# Patient Record
Sex: MALE | Race: WHITE | Employment: FULL TIME | URBAN - METROPOLITAN AREA
[De-identification: names, ages, dates, MRNs, and addresses within clinical notes are randomized per-mention and may not be internally consistent; named-entity substitution may affect disease eponyms.]

---

## 2017-01-21 ENCOUNTER — DOCUMENTATION ONLY (OUTPATIENT)
Dept: CARDIOLOGY | Facility: CLINIC | Age: 37
End: 2017-01-21

## 2017-01-21 NOTE — Clinical Note
January 21, 2017       TO: Milton Rivera  [address] [facility] [address]       Dear Milton Rivera,    We are reviewing our outstanding orders.    Dr. Nicholas has ordered an office visit with his assistant with lab work and a stress echo for follow up.      Please contact the scheduling desk at [phone] to arranged for an appointment.     If you have any questions, please call the [facility] nurse phone @ [phone]. If you had your lab work done at another facility, please give us a call so we can locate the results.     Thank you  [facility] nurses

## 2017-04-10 ENCOUNTER — RECORDS - HEALTHEAST (OUTPATIENT)
Dept: LAB | Facility: CLINIC | Age: 37
End: 2017-04-10

## 2017-04-10 LAB
CHOLEST SERPL-MCNC: 184 MG/DL
FASTING STATUS PATIENT QL REPORTED: NORMAL
HDLC SERPL-MCNC: 47 MG/DL
LDLC SERPL CALC-MCNC: 108 MG/DL
TRIGL SERPL-MCNC: 147 MG/DL

## 2018-03-28 ENCOUNTER — AMBULATORY - HEALTHEAST (OUTPATIENT)
Dept: CARDIOLOGY | Facility: CLINIC | Age: 38
End: 2018-03-28

## 2018-03-28 ENCOUNTER — RECORDS - HEALTHEAST (OUTPATIENT)
Dept: ADMINISTRATIVE | Facility: OTHER | Age: 38
End: 2018-03-28

## 2018-03-28 ENCOUNTER — OFFICE VISIT - HEALTHEAST (OUTPATIENT)
Dept: CARDIOLOGY | Facility: CLINIC | Age: 38
End: 2018-03-28

## 2018-03-28 ENCOUNTER — COMMUNICATION - HEALTHEAST (OUTPATIENT)
Dept: TELEHEALTH | Facility: CLINIC | Age: 38
End: 2018-03-28

## 2018-03-28 DIAGNOSIS — I10 ESSENTIAL HYPERTENSION: ICD-10-CM

## 2018-03-28 DIAGNOSIS — R07.9 CHEST PAIN, UNSPECIFIED TYPE: ICD-10-CM

## 2018-03-28 DIAGNOSIS — G47.33 OSA ON CPAP: ICD-10-CM

## 2018-03-28 ASSESSMENT — MIFFLIN-ST. JEOR: SCORE: 2350.96

## 2018-08-03 ENCOUNTER — RECORDS - HEALTHEAST (OUTPATIENT)
Dept: LAB | Facility: CLINIC | Age: 38
End: 2018-08-03

## 2018-08-03 ENCOUNTER — RECORDS - HEALTHEAST (OUTPATIENT)
Dept: ADMINISTRATIVE | Facility: OTHER | Age: 38
End: 2018-08-03

## 2018-08-03 LAB
ALBUMIN SERPL-MCNC: 4.2 G/DL (ref 3.5–5)
ALP SERPL-CCNC: 46 U/L (ref 45–120)
ALT SERPL W P-5'-P-CCNC: 87 U/L (ref 0–45)
ANION GAP SERPL CALCULATED.3IONS-SCNC: 15 MMOL/L (ref 5–18)
AST SERPL W P-5'-P-CCNC: 48 U/L (ref 0–40)
BILIRUB SERPL-MCNC: 1.4 MG/DL (ref 0–1)
BUN SERPL-MCNC: 14 MG/DL (ref 8–22)
CALCIUM SERPL-MCNC: 9.6 MG/DL (ref 8.5–10.5)
CHLORIDE BLD-SCNC: 107 MMOL/L (ref 98–107)
CHOLEST SERPL-MCNC: 232 MG/DL
CO2 SERPL-SCNC: 21 MMOL/L (ref 22–31)
CREAT SERPL-MCNC: 0.93 MG/DL (ref 0.7–1.3)
FASTING STATUS PATIENT QL REPORTED: YES
GFR SERPL CREATININE-BSD FRML MDRD: >60 ML/MIN/1.73M2
GLUCOSE BLD-MCNC: 83 MG/DL (ref 70–125)
HDLC SERPL-MCNC: 44 MG/DL
LDLC SERPL CALC-MCNC: 166 MG/DL
POTASSIUM BLD-SCNC: 3.8 MMOL/L (ref 3.5–5)
PROT SERPL-MCNC: 7.5 G/DL (ref 6–8)
SODIUM SERPL-SCNC: 143 MMOL/L (ref 136–145)
TRIGL SERPL-MCNC: 109 MG/DL
TSH SERPL DL<=0.005 MIU/L-ACNC: 0.84 UIU/ML (ref 0.3–5)

## 2018-08-07 ENCOUNTER — AMBULATORY - HEALTHEAST (OUTPATIENT)
Dept: SURGERY | Facility: CLINIC | Age: 38
End: 2018-08-07

## 2018-08-07 DIAGNOSIS — R63.1 POLYDIPSIA: ICD-10-CM

## 2018-09-28 ENCOUNTER — RECORDS - HEALTHEAST (OUTPATIENT)
Dept: LAB | Facility: CLINIC | Age: 38
End: 2018-09-28

## 2018-09-28 LAB
ALBUMIN SERPL-MCNC: 4.1 G/DL (ref 3.5–5)
ALP SERPL-CCNC: 58 U/L (ref 45–120)
ALT SERPL W P-5'-P-CCNC: 35 U/L (ref 0–45)
AST SERPL W P-5'-P-CCNC: 23 U/L (ref 0–40)
BILIRUB DIRECT SERPL-MCNC: 0.3 MG/DL
BILIRUB SERPL-MCNC: 1 MG/DL (ref 0–1)
PROT SERPL-MCNC: 7.1 G/DL (ref 6–8)

## 2018-11-30 ENCOUNTER — RECORDS - HEALTHEAST (OUTPATIENT)
Dept: LAB | Facility: CLINIC | Age: 38
End: 2018-11-30

## 2018-11-30 LAB
ANION GAP SERPL CALCULATED.3IONS-SCNC: 10 MMOL/L (ref 5–18)
BUN SERPL-MCNC: 15 MG/DL (ref 8–22)
CALCIUM SERPL-MCNC: 9.6 MG/DL (ref 8.5–10.5)
CHLORIDE BLD-SCNC: 106 MMOL/L (ref 98–107)
CO2 SERPL-SCNC: 26 MMOL/L (ref 22–31)
CREAT SERPL-MCNC: 0.86 MG/DL (ref 0.7–1.3)
GFR SERPL CREATININE-BSD FRML MDRD: >60 ML/MIN/1.73M2
GLUCOSE BLD-MCNC: 84 MG/DL (ref 70–125)
POTASSIUM BLD-SCNC: 3.9 MMOL/L (ref 3.5–5)
SODIUM SERPL-SCNC: 142 MMOL/L (ref 136–145)

## 2019-10-01 ENCOUNTER — HEALTH MAINTENANCE LETTER (OUTPATIENT)
Age: 39
End: 2019-10-01

## 2020-01-09 ENCOUNTER — RECORDS - HEALTHEAST (OUTPATIENT)
Dept: LAB | Facility: CLINIC | Age: 40
End: 2020-01-09

## 2020-01-09 LAB
ALBUMIN SERPL-MCNC: 4 G/DL (ref 3.5–5)
ALP SERPL-CCNC: 54 U/L (ref 45–120)
ALT SERPL W P-5'-P-CCNC: 51 U/L (ref 0–45)
ANION GAP SERPL CALCULATED.3IONS-SCNC: 11 MMOL/L (ref 5–18)
AST SERPL W P-5'-P-CCNC: 30 U/L (ref 0–40)
BILIRUB SERPL-MCNC: 0.6 MG/DL (ref 0–1)
BUN SERPL-MCNC: 10 MG/DL (ref 8–22)
CALCIUM SERPL-MCNC: 9.6 MG/DL (ref 8.5–10.5)
CHLORIDE BLD-SCNC: 106 MMOL/L (ref 98–107)
CHOLEST SERPL-MCNC: 245 MG/DL
CO2 SERPL-SCNC: 23 MMOL/L (ref 22–31)
CREAT SERPL-MCNC: 0.85 MG/DL (ref 0.7–1.3)
FASTING STATUS PATIENT QL REPORTED: ABNORMAL
GFR SERPL CREATININE-BSD FRML MDRD: >60 ML/MIN/1.73M2
GLUCOSE BLD-MCNC: 88 MG/DL (ref 70–125)
HDLC SERPL-MCNC: 48 MG/DL
LDLC SERPL CALC-MCNC: 148 MG/DL
POTASSIUM BLD-SCNC: 3.8 MMOL/L (ref 3.5–5)
PROT SERPL-MCNC: 7.4 G/DL (ref 6–8)
SODIUM SERPL-SCNC: 140 MMOL/L (ref 136–145)
TRIGL SERPL-MCNC: 245 MG/DL

## 2020-04-14 ENCOUNTER — APPOINTMENT (OUTPATIENT)
Age: 40
Setting detail: DERMATOLOGY
End: 2020-04-14

## 2020-04-14 VITALS — HEIGHT: 70 IN | WEIGHT: 315 LBS

## 2020-04-14 DIAGNOSIS — L73.8 OTHER SPECIFIED FOLLICULAR DISORDERS: ICD-10-CM

## 2020-04-14 DIAGNOSIS — L82.1 OTHER SEBORRHEIC KERATOSIS: ICD-10-CM

## 2020-04-14 DIAGNOSIS — L20.89 OTHER ATOPIC DERMATITIS: ICD-10-CM

## 2020-04-14 DIAGNOSIS — D22 MELANOCYTIC NEVI: ICD-10-CM

## 2020-04-14 PROBLEM — D22.5 MELANOCYTIC NEVI OF TRUNK: Status: ACTIVE | Noted: 2020-04-14

## 2020-04-14 PROBLEM — L20.84 INTRINSIC (ALLERGIC) ECZEMA: Status: ACTIVE | Noted: 2020-04-14

## 2020-04-14 PROCEDURE — OTHER ADDITIONAL NOTES: OTHER

## 2020-04-14 PROCEDURE — OTHER PRESCRIPTION: OTHER

## 2020-04-14 PROCEDURE — 99213 OFFICE O/P EST LOW 20 MIN: CPT

## 2020-04-14 PROCEDURE — OTHER PHOTO-DOCUMENTATION: OTHER

## 2020-04-14 PROCEDURE — OTHER COUNSELING: OTHER

## 2020-04-14 RX ORDER — TRIAMCINOLONE ACETONIDE 1 MG/G
0.1% CREAM TOPICAL BID PRN
Qty: 1 | Refills: 1 | Status: ERX | COMMUNITY
Start: 2020-04-14

## 2020-04-14 ASSESSMENT — LOCATION ZONE DERM
LOCATION ZONE: HAND
LOCATION ZONE: FACE
LOCATION ZONE: TRUNK
LOCATION ZONE: SCALP

## 2020-04-14 ASSESSMENT — LOCATION DETAILED DESCRIPTION DERM
LOCATION DETAILED: MEDIAL FRONTAL SCALP
LOCATION DETAILED: LEFT ULNAR DORSAL HAND
LOCATION DETAILED: RIGHT RADIAL DORSAL HAND
LOCATION DETAILED: SUPERIOR LUMBAR SPINE
LOCATION DETAILED: INFERIOR MID FOREHEAD

## 2020-04-14 ASSESSMENT — LOCATION SIMPLE DESCRIPTION DERM
LOCATION SIMPLE: LEFT HAND
LOCATION SIMPLE: FRONTAL SCALP
LOCATION SIMPLE: LOWER BACK
LOCATION SIMPLE: INFERIOR FOREHEAD
LOCATION SIMPLE: RIGHT HAND

## 2020-04-14 NOTE — PROCEDURE: PHOTO-DOCUMENTATION
Detail Level: Zone
Details (Free Text): photo + measurement
Photo Preface (Leave Blank If You Do Not Want): Photographs were obtained today

## 2020-04-14 NOTE — PROCEDURE: ADDITIONAL NOTES
Additional Notes: Discussed that this lesion can be monitored closely or biopsied due to patients wife’s concern. Patient prefers that we monitor this lesion at this time.
Detail Level: Simple
Additional Notes: Patient may continue using oatmeal moisturizer.

## 2020-04-14 NOTE — HPI: RASH (ECZEMA)
How Severe Is Your Eczema?: moderate
Is This A New Presentation, Or A Follow-Up?: Rash
Additional History: His daughter has eczema but he has never had it

## 2021-01-15 ENCOUNTER — HEALTH MAINTENANCE LETTER (OUTPATIENT)
Age: 41
End: 2021-01-15

## 2021-06-01 VITALS — BODY MASS INDEX: 44.1 KG/M2 | HEIGHT: 71 IN | WEIGHT: 315 LBS

## 2021-06-16 NOTE — PROGRESS NOTES
[facility] Consultation Note    Thank you, Dr. Timo Pack MD, for asking the [facility] team to see Milton Rivera in consultation today at our clinic to evaluate chest pain.      Assessment:   1.  Atypical chest pain doubt anginal  2.  Essential hypertension  3.  Obstructive sleep apnea on nocturnal CPAP     Plan:   We will exclude significant underlying coronary artery disease with a Lexiscan Cardiolite test in the near future.  Will do a Lexiscan test due to beta-blocker therapy.  If his Cardiolite results are unremarkable no further cardiac evaluation or follow-up is needed            Current History:   37-year-old male with a history of intermittent chest pain.  He was evaluated with coronary angiography at [facility] roughly 9 years ago with normal coronary arteries reported.  The actual results were not available for review.  He had a Lexiscan Cardiolite test performed in October 2012 that showed no evidence of ischemia with ejection fraction 56%.  Over the last 2 weeks he has left upper chest discomfort above his left breast that occurs nonexertional he would come and go but generally lasts less than an hour.  Last night he had a more intense episode that was somewhat squeezing in nature radiating to his left arm again relieves spontaneously and not exertionally related.  Patient is concerned about coronary artery disease since his father had coronary artery bypass surgery at age 66    Patient does have a history of hypertension on medications for 1 year.  Fasting lipid panel from a year ago revealed LDL cholesterol 108 with an HDL cholesterol 47    Past Medical History:   No past medical history on file.  Essential hypertension and obstructive sleep apnea on nocturnal CPAP    Past Surgical History:   No past surgical history on file.  Bilateral bunionectomy as well as bilateral orthoscopic surgery and bilateral orthoscopic knee surgery    Family History:   No  "family history on file.  Father had aortic valve replacement with coronary artery bypass surgery at age 66.  He was a 2 pack a day smoker.  Mother is alive at age 60 without known coronary artery disease    Social History:    reports that he has never smoked. He has never used smokeless tobacco.  Patient is  and employed    Meds:   Scheduled Meds:  PRN Meds:.    Allergies:   Review of patient's allergies indicates no known allergies.    Review of Systems:   General: WNL  Eyes: WNL  Ears/Nose/Throat: WNL  Lungs: Shortness of Breath  Heart: Chest Pain, Arm Pain, Shortness of Breath with activity  Stomach: WNL  Bladder: WNL  Muscle/Joints: WNL  Skin: WNL  Nervous System: WNL  Mental Health: WNL     Blood: WNL      Objective:      Physical Exam  (!) 315 lb (142.9 kg)  5' 11\" (1.803 m)  Body mass index is 43.93 kg/(m^2).  BP (!) 168/112 (Patient Site: Right Arm, Patient Position: Sitting, Cuff Size: Adult Large)  Pulse 60  Resp 18  Ht 5' 11\" (1.803 m)  Wt (!) 315 lb (142.9 kg)  BMI 43.93 kg/m2    General Appearance:   alert, no apparent distress   HEENT:  no scleral icterus; the mucous membranes were pink and moist                                  Neck: jugular venous pressure is normal, no thyromegaly   Chest: the spine was straight and the chest was symmetric   Lungs:   respirations unlabored; the lungs are clear to auscultation   Cardiovascular:   regular rhythm with normal first and second heart sounds and no murmurs, clicks, or gallops. Carotid, radial, femoral, and posterior tibial pulses are intact; there are no carotid or femoral bruits.   Abdomen:  no organomegaly, masses, bruits, or tenderness; bowel sounds are present   Extremities: no cyanosis, clubbing, or edema.  No obvious musculoskeletal abnormalities   Skin: no xanthelasma   Neurologic: mood and affect are appropriate             Lab Review   Lab Results   Component Value Date     04/10/2017    K 3.9 04/10/2017     (H) 04/10/2017 "    CO2 23 04/10/2017    BUN 11 04/10/2017    CREATININE 0.87 04/10/2017    CALCIUM 9.2 04/10/2017     Lab Results   Component Value Date    WBC 13.2 (H) 10/18/2012    HGB 15.2 10/18/2012    HCT 45.3 10/18/2012    MCV 91 10/18/2012     10/18/2012     Lab Results   Component Value Date    CHOL 184 04/10/2017    TRIG 147 04/10/2017    HDL 47 04/10/2017         Mukul Jade M.D., F.A.C.C.  [facility]  [phone]

## 2021-08-24 ENCOUNTER — APPOINTMENT (OUTPATIENT)
Dept: URBAN - METROPOLITAN AREA CLINIC 260 | Age: 41
Setting detail: DERMATOLOGY
End: 2021-08-25

## 2021-08-24 VITALS — WEIGHT: 315 LBS | HEIGHT: 70 IN | RESPIRATION RATE: 17 BRPM

## 2021-08-24 DIAGNOSIS — D22 MELANOCYTIC NEVI: ICD-10-CM

## 2021-08-24 DIAGNOSIS — D49.2 NEOPLASM OF UNSPECIFIED BEHAVIOR OF BONE, SOFT TISSUE, AND SKIN: ICD-10-CM

## 2021-08-24 DIAGNOSIS — L81.4 OTHER MELANIN HYPERPIGMENTATION: ICD-10-CM

## 2021-08-24 DIAGNOSIS — D18.0 HEMANGIOMA: ICD-10-CM

## 2021-08-24 DIAGNOSIS — L82.1 OTHER SEBORRHEIC KERATOSIS: ICD-10-CM

## 2021-08-24 DIAGNOSIS — Z71.89 OTHER SPECIFIED COUNSELING: ICD-10-CM

## 2021-08-24 PROBLEM — D18.01 HEMANGIOMA OF SKIN AND SUBCUTANEOUS TISSUE: Status: ACTIVE | Noted: 2021-08-24

## 2021-08-24 PROBLEM — D22.5 MELANOCYTIC NEVI OF TRUNK: Status: ACTIVE | Noted: 2021-08-24

## 2021-08-24 PROCEDURE — 99213 OFFICE O/P EST LOW 20 MIN: CPT | Mod: 25

## 2021-08-24 PROCEDURE — 11103 TANGNTL BX SKIN EA SEP/ADDL: CPT

## 2021-08-24 PROCEDURE — 88305 TISSUE EXAM BY PATHOLOGIST: CPT

## 2021-08-24 PROCEDURE — OTHER PATHOLOGY BILLING: OTHER

## 2021-08-24 PROCEDURE — OTHER BIOPSY BY SHAVE METHOD: OTHER

## 2021-08-24 PROCEDURE — OTHER COUNSELING: OTHER

## 2021-08-24 PROCEDURE — 11102 TANGNTL BX SKIN SINGLE LES: CPT

## 2021-08-24 ASSESSMENT — LOCATION DETAILED DESCRIPTION DERM
LOCATION DETAILED: INFERIOR LUMBAR SPINE
LOCATION DETAILED: RIGHT INFERIOR UPPER BACK
LOCATION DETAILED: INFERIOR THORACIC SPINE
LOCATION DETAILED: RIGHT MEDIAL UPPER BACK
LOCATION DETAILED: RIGHT MEDIAL UPPER BACK

## 2021-08-24 ASSESSMENT — LOCATION SIMPLE DESCRIPTION DERM
LOCATION SIMPLE: RIGHT UPPER BACK
LOCATION SIMPLE: UPPER BACK
LOCATION SIMPLE: LOWER BACK
LOCATION SIMPLE: RIGHT UPPER BACK

## 2021-08-24 ASSESSMENT — LOCATION ZONE DERM
LOCATION ZONE: TRUNK
LOCATION ZONE: TRUNK

## 2021-08-24 NOTE — PROCEDURE: PATHOLOGY BILLING
Immunohistochemistry (00179 and [id number]) billing is not performed here. Please use the Immunohistochemistry Stain Billing plan to accomplish this.

## 2021-08-24 NOTE — PROCEDURE: PATHOLOGY BILLING
Immunohistochemistry (75620 and [id number]) billing is not performed here. Please use the Immunohistochemistry Stain Billing plan to accomplish this. Immunohistochemistry ([id number] and 00135) billing is not performed here. Please use the Immunohistochemistry Stain Billing plan to accomplish this.

## 2021-08-24 NOTE — PROCEDURE: PATHOLOGY BILLING
Immunohistochemistry ([id number] and [id number]) billing is not performed here. Please use the Immunohistochemistry Stain Billing plan to accomplish this.

## 2021-08-24 NOTE — PROCEDURE: BIOPSY BY SHAVE METHOD
Billing Type: Client Bill
Dressing: bandage
Notification Instructions: Patient will be notified of biopsy results. However, patient instructed to call the office if not contacted within 2 weeks.
Information: Selecting Yes will display possible errors in your note based on the variables you have selected. This validation is only offered as a suggestion for you. PLEASE NOTE THAT THE VALIDATION TEXT WILL BE REMOVED WHEN YOU FINALIZE YOUR NOTE. IF YOU WANT TO FAX A PRELIMINARY NOTE YOU WILL NEED TO TOGGLE THIS TO 'NO' IF YOU DO NOT WANT IT IN YOUR FAXED NOTE.
Detail Level: Detailed
Render In Bullet Format When Appropriate: No
Curettage Text: The wound bed was treated with curettage after the biopsy was performed.
Size Of Lesion In Cm: 0
Anesthesia Volume In Cc (Will Not Render If 0): 0.5
Consent: Written consent was obtained and risks were reviewed including but not limited to scarring, infection, bleeding, scabbing, incomplete removal, nerve damage and allergy to anesthesia.
Electrodesiccation And Curettage Text: The wound bed was treated with electrodesiccation and curettage after the biopsy was performed.
Biopsy Method: Dermablade
Hemostasis: Drysol
Anesthesia Type: 1% lidocaine with epinephrine
Cryotherapy Text: The wound bed was treated with cryotherapy after the biopsy was performed.
Depth Of Biopsy: dermis
Silver Nitrate Text: The wound bed was treated with silver nitrate after the biopsy was performed.
Was A Bandage Applied: Yes
Post-Care Instructions: I reviewed with the patient in detail post-care instructions. Patient is to keep the biopsy site dry overnight, and then apply bacitracin twice daily until healed. Patient may apply hydrogen peroxide soaks to remove any crusting.
Type Of Destruction Used: Curettage
Wound Care: Petrolatum
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
Biopsy Type: H and E
Electrodesiccation Text: The wound bed was treated with electrodesiccation after the biopsy was performed.

## 2021-08-26 ENCOUNTER — OFFICE VISIT (OUTPATIENT)
Dept: CARDIOLOGY | Facility: CLINIC | Age: 41
End: 2021-08-26
Payer: COMMERCIAL

## 2021-08-26 ENCOUNTER — APPOINTMENT (OUTPATIENT)
Dept: LAB | Facility: CLINIC | Age: 41
End: 2021-08-26
Payer: COMMERCIAL

## 2021-08-26 VITALS
WEIGHT: 315 LBS | BODY MASS INDEX: 45.1 KG/M2 | HEART RATE: 80 BPM | HEIGHT: 70 IN | DIASTOLIC BLOOD PRESSURE: 110 MMHG | SYSTOLIC BLOOD PRESSURE: 140 MMHG | RESPIRATION RATE: 12 BRPM

## 2021-08-26 DIAGNOSIS — R07.9 CHEST PAIN, UNSPECIFIED TYPE: Primary | ICD-10-CM

## 2021-08-26 LAB
D DIMER PPP FEU-MCNC: 0.31 UG/ML FEU (ref 0–0.5)
TSH SERPL DL<=0.005 MIU/L-ACNC: 1.08 UIU/ML (ref 0.3–5)

## 2021-08-26 PROCEDURE — 99204 OFFICE O/P NEW MOD 45 MIN: CPT | Performed by: INTERNAL MEDICINE

## 2021-08-26 PROCEDURE — 36415 COLL VENOUS BLD VENIPUNCTURE: CPT | Performed by: INTERNAL MEDICINE

## 2021-08-26 PROCEDURE — 93000 ELECTROCARDIOGRAM COMPLETE: CPT | Performed by: INTERNAL MEDICINE

## 2021-08-26 PROCEDURE — 84443 ASSAY THYROID STIM HORMONE: CPT | Performed by: INTERNAL MEDICINE

## 2021-08-26 PROCEDURE — 85379 FIBRIN DEGRADATION QUANT: CPT | Performed by: INTERNAL MEDICINE

## 2021-08-26 RX ORDER — AMLODIPINE BESYLATE 10 MG/1
10 TABLET ORAL DAILY
COMMUNITY

## 2021-08-26 RX ORDER — LOSARTAN POTASSIUM 100 MG/1
100 TABLET ORAL DAILY
COMMUNITY
Start: 2019-01-19

## 2021-08-26 ASSESSMENT — MIFFLIN-ST. JEOR: SCORE: 2436.69

## 2021-08-26 NOTE — PATIENT INSTRUCTIONS
Milton Rivera,    It was a pleasure to see you today at the [facility].     My recommendations after this visit include:    Blood work, will adjust your blood pressure medications when the results are available  Echocardiogram to assess the strength of your heart and affect of high blood pressure    Blood test to check for blood clotting causing leg swelling and chest pain      DEEPTI Stevenson MD, FACC, [facility] hospital

## 2021-08-26 NOTE — PROGRESS NOTES
Thank you, Dr. Gonzalez ref. provider found, for asking [facility] to evaluate Mr. Milton Rivera.      Assessment/Recommendations   Assessment:    Chest pain, atypical features  Hypertension, poor control  Obstructive sleep apnea  Obesity    Plan:  Comprehensive metabolic panel and CBC today  TSH  D-dimer to screen for thrombosis/PE  We will start diuretics to improve BP control when the results of the blood work available    If kidney function is acceptable and D-dimer negative will proceed with CT coronary angiogram  Echo to assess for hypertensive heart    We discussed lifestyle modifications including salt avoidance, regular exercise, weight reduction     History of Present Illness    Mr. Milton Rivera is a 40 year old male who is in for initial cardiac evaluation.  He reports that he has been having left-sided chest pains on and off for last several weeks.  This pain comes on at rest and last for a few minutes.  He denies pleuritic features of the pain.  There is no associated heart palpitations or shortness of breath.  He has had pain similar to that in the past.  Reportedly he had normal coronary angiogram in 2007 .  He was last seen by cardiologist in 2018 at [facility].  He had normal CT coronary angiogram and nuclear stress test that year.  He complains of leg swelling, left worse than right.  He has no history of DVT or PE.    He states that he has had elevated blood pressure for many years.  He has been compliant with antihypertensive medications.  He has never taken diuretics.  In 2018 he had normal CT abdomen/no renal artery stenosis.    Denies history of valvular heart disease.  ECG: Personally reviewed.  Nuclear perfusion test 2018 at [facility]  Normal  CT coronary angiogram in 2018 at [facility]  Normal    Coronary Angiogram: 2007 reportedly normal     Physical Examination Review of Systems   BP (!) 140/110 (BP Location: Right arm, Patient Position: Sitting, Cuff Size:  "Adult Large)   Pulse 80   Resp 12   Ht 1.778 m (5' 10\")   Wt (!) 153 kg (337 lb 6.4 oz)   BMI 48.41 kg/m    Body mass index is 48.41 kg/m .  Wt Readings from Last 3 Encounters:   08/26/21 (!) 153 kg (337 lb 6.4 oz)   03/28/18 142.9 kg (315 lb)   06/03/16 (!) 136.2 kg (300 lb 3.2 oz)     General Appearance:   Alert, cooperative, no distress, appears stated age   Head/ENT: Normocephalic, without obvious abnormality. Membranes moist      EYES:  no scleral icterus, normal conjunctivae   Neck: Supple, symmetrical, trachea midline, no adenopathy, thyroid: not enlarged, symmetric, no carotid bruit or JVD   Chest/Lungs:   Lungs are clear to auscultation, respirations unlabored. No tenderness or deformity    Cardiovascular:   Regular rhythm, S1, S2 normal, no murmur, rub or gallop.   Abdomen:  Soft, non-tender, bowel sounds active all four quadrants,  no masses, no organomegaly   Extremities: no cyanosis or clubbing. No edema   Skin: Skin color, texture, turgor normal, no rashes or lesions.    Psychiatric: Normal affect, calm   Neurologic: Alert and oriented x 3, moving all four extremities.     Enc Vitals  BP: (!) 140/110  Pulse: 80  Resp: 12  Weight: (!) 153 kg (337 lb 6.4 oz)  Height: 177.8 cm (5' 10\")                                          Lab Results    Chemistry/lipid CBC Cardiac Enzymes/BNP/TSH/INR   Recent Labs   Lab Test 01/09/20  1546   CHOL 245*   HDL 48   *   TRIG 245*     Recent Labs   Lab Test 01/09/20  1546 08/03/18  1553 04/10/17  0940   * 166* 108     Recent Labs   Lab Test 01/09/20  1546      POTASSIUM 3.8   CHLORIDE 106   CO2 23   GLC 88   BUN 10   CR 0.85   GFRESTIMATED >60   ARIANNE 9.6     Recent Labs   Lab Test 01/09/20  1546 11/30/18  1423 09/03/18  1234   CR 0.85 0.86 0.91     No results for input(s): A1C in the last 01439 hours.       Recent Labs   Lab Test 09/03/18  1234   WBC 4.6   HGB 14.2   HCT 40.2   MCV 90        Recent Labs   Lab Test 09/03/18  1234   HGB 14.2    " Recent Labs   Lab Test 09/03/18  1234   TROPONINI <0.01     No results for input(s): BNP, NTBNPI, NTBNP in the last 95605 hours.  Recent Labs   Lab Test 08/03/18  1553   TSH 0.84     Recent Labs   Lab Test 09/03/18  1234   INR 1.09        Medical History  Surgical History Family History Social History   Past Medical History:   Diagnosis Date     Arthritis     Knees     Gastro-oesophageal reflux disease      PONV (postoperative nausea and vomiting)      Sleep apnea     Uses CPAP     Past Surgical History:   Procedure Laterality Date     ARTHROSCOPY KNEE RT/LT Bilateral      ARTHROSCOPY SHOULDER RT/LT Bilateral      BUNIONECTOMY Left 11/21/2014    Procedure: BUNIONECTOMY;  Surgeon: Rome Munoz DPM;  Location: [facility] OR     BUNIONECTOMY RT/LT Right      No premature CAD, SCD,cardiomyopathy  Father had aortic valve replacement in his later stage of life Social History     Socioeconomic History     Marital status:      Spouse name: Not on file     Number of children: 1     Years of education: Not on file     Highest education level: Not on file   Occupational History     Occupation: Customer service     Employer: [employer]   Tobacco Use     Smoking status: Never Smoker     Smokeless tobacco: Never Used   Substance and Sexual Activity     Alcohol use: Yes     Comment: Occas     Drug use: No     Sexual activity: Yes     Partners: Female     Birth control/protection: Condom   Other Topics Concern     Parent/sibling w/ CABG, MI or angioplasty before 65F 55M? Not Asked      Service Not Asked     Blood Transfusions Not Asked     Caffeine Concern Not Asked     Occupational Exposure Not Asked     Hobby Hazards Not Asked     Sleep Concern Not Asked     Stress Concern Not Asked     Weight Concern Not Asked     Special Diet No     Back Care Not Asked     Exercise Yes     Comment: daily     Bike Helmet Not Asked     Seat Belt Not Asked     Self-Exams Not Asked   Social History Narrative     Not on file     Social  Determinants of Health     Financial Resource Strain:      Difficulty of Paying Living Expenses:    Food Insecurity:      Worried About Running Out of Food in the Last Year:      Ran Out of Food in the Last Year:    Transportation Needs:      Lack of Transportation (Medical):      Lack of Transportation (Non-Medical):    Physical Activity:      Days of Exercise per Week:      Minutes of Exercise per Session:    Stress:      Feeling of Stress :    Social Connections:      Frequency of Communication with Friends and Family:      Frequency of Social Gatherings with Friends and Family:      Attends Pentecostal Services:      Active Member of Clubs or Organizations:      Attends Club or Organization Meetings:      Marital Status:    Intimate Partner Violence:      Fear of Current or Ex-Partner:      Emotionally Abused:      Physically Abused:      Sexually Abused:          Medications  Allergies   Current Outpatient Medications   Medication Sig Dispense Refill     amLODIPine (NORVASC) 10 MG tablet Take 10 mg by mouth daily       losartan (COZAAR) 100 MG tablet Take 100 mg by mouth daily       Metoprolol Succinate 200 MG CS24 Take 200 mg by mouth daily        No Known Allergies

## 2021-08-26 NOTE — LETTER
8/26/2021    Timo Pack MD  [facility] [address] [address]    RE: Milton Rivera       Dear Colleague,    I had the pleasure of seeing Milton Rivera in the [facility].          Thank you, Dr. Gonzalez ref. provider found, for asking [facility] to evaluate Mr. Milton Rivera.      Assessment/Recommendations   Assessment:    Chest pain, atypical features  Hypertension, poor control  Obstructive sleep apnea  Obesity    Plan:  Comprehensive metabolic panel and CBC today  TSH  D-dimer to screen for thrombosis/PE  We will start diuretics to improve BP control when the results of the blood work available    If kidney function is acceptable and D-dimer negative will proceed with CT coronary angiogram  Echo to assess for hypertensive heart    We discussed lifestyle modifications including salt avoidance, regular exercise, weight reduction     History of Present Illness    Mr. Milton Rivera is a 40 year old male who is in for initial cardiac evaluation.  He reports that he has been having left-sided chest pains on and off for last several weeks.  This pain comes on at rest and last for a few minutes.  He denies pleuritic features of the pain.  There is no associated heart palpitations or shortness of breath.  He has had pain similar to that in the past.  Reportedly he had normal coronary angiogram in 2007 .  He was last seen by cardiologist in 2018 at [facility].  He had normal CT coronary angiogram and nuclear stress test that year.  He complains of leg swelling, left worse than right.  He has no history of DVT or PE.    He states that he has had elevated blood pressure for many years.  He has been compliant with antihypertensive medications.  He has never taken diuretics.  In 2018 he had normal CT abdomen/no renal artery stenosis.    Denies history of valvular heart disease.  ECG:  "Personally reviewed.  Nuclear perfusion test 2018 at [facility]  Normal  CT coronary angiogram in 2018 at [facility]  Normal    Coronary Angiogram: 2007 reportedly normal     Physical Examination Review of Systems   BP (!) 140/110 (BP Location: Right arm, Patient Position: Sitting, Cuff Size: Adult Large)   Pulse 80   Resp 12   Ht 1.778 m (5' 10\")   Wt (!) 153 kg (337 lb 6.4 oz)   BMI 48.41 kg/m    Body mass index is 48.41 kg/m .  Wt Readings from Last 3 Encounters:   08/26/21 (!) 153 kg (337 lb 6.4 oz)   03/28/18 142.9 kg (315 lb)   06/03/16 (!) 136.2 kg (300 lb 3.2 oz)     General Appearance:   Alert, cooperative, no distress, appears stated age   Head/ENT: Normocephalic, without obvious abnormality. Membranes moist      EYES:  no scleral icterus, normal conjunctivae   Neck: Supple, symmetrical, trachea midline, no adenopathy, thyroid: not enlarged, symmetric, no carotid bruit or JVD   Chest/Lungs:   Lungs are clear to auscultation, respirations unlabored. No tenderness or deformity    Cardiovascular:   Regular rhythm, S1, S2 normal, no murmur, rub or gallop.   Abdomen:  Soft, non-tender, bowel sounds active all four quadrants,  no masses, no organomegaly   Extremities: no cyanosis or clubbing. No edema   Skin: Skin color, texture, turgor normal, no rashes or lesions.    Psychiatric: Normal affect, calm   Neurologic: Alert and oriented x 3, moving all four extremities.     Enc Vitals  BP: (!) 140/110  Pulse: 80  Resp: 12  Weight: (!) 153 kg (337 lb 6.4 oz)  Height: 177.8 cm (5' 10\")                                          Lab Results    Chemistry/lipid CBC Cardiac Enzymes/BNP/TSH/INR   Recent Labs   Lab Test 01/09/20  1546   CHOL 245*   HDL 48   *   TRIG 245*     Recent Labs   Lab Test 01/09/20  1546 08/03/18  1553 04/10/17  0940   * 166* 108     Recent Labs   Lab Test 01/09/20  1546      POTASSIUM 3.8   CHLORIDE 106   CO2 23   GLC 88   BUN 10   CR 0.85   GFRESTIMATED >60   ARIANNE 9.6     Recent " Labs   Lab Test 01/09/20  1546 11/30/18  1423 09/03/18  1234   CR 0.85 0.86 0.91     No results for input(s): A1C in the last 33944 hours.       Recent Labs   Lab Test 09/03/18  1234   WBC 4.6   HGB 14.2   HCT 40.2   MCV 90        Recent Labs   Lab Test 09/03/18  1234   HGB 14.2    Recent Labs   Lab Test 09/03/18  1234   TROPONINI <0.01     No results for input(s): BNP, NTBNPI, NTBNP in the last 30933 hours.  Recent Labs   Lab Test 08/03/18  1553   TSH 0.84     Recent Labs   Lab Test 09/03/18  1234   INR 1.09        Medical History  Surgical History Family History Social History   Past Medical History:   Diagnosis Date     Arthritis     Knees     Gastro-oesophageal reflux disease      PONV (postoperative nausea and vomiting)      Sleep apnea     Uses CPAP     Past Surgical History:   Procedure Laterality Date     ARTHROSCOPY KNEE RT/LT Bilateral      ARTHROSCOPY SHOULDER RT/LT Bilateral      BUNIONECTOMY Left 11/21/2014    Procedure: BUNIONECTOMY;  Surgeon: Rome Munoz DPM;  Location: [facility] OR     BUNIONECTOMY RT/LT Right      No premature CAD, SCD,cardiomyopathy  Father had aortic valve replacement in his later stage of life Social History     Socioeconomic History     Marital status:      Spouse name: Not on file     Number of children: 1     Years of education: Not on file     Highest education level: Not on file   Occupational History     Occupation: Customer service     Employer: [employer]   Tobacco Use     Smoking status: Never Smoker     Smokeless tobacco: Never Used   Substance and Sexual Activity     Alcohol use: Yes     Comment: Occas     Drug use: No     Sexual activity: Yes     Partners: Female     Birth control/protection: Condom   Other Topics Concern     Parent/sibling w/ CABG, MI or angioplasty before 65F 55M? Not Asked      Service Not Asked     Blood Transfusions Not Asked     Caffeine Concern Not Asked     Occupational Exposure Not Asked     Hobby Hazards Not Asked     Sleep  Concern Not Asked     Stress Concern Not Asked     Weight Concern Not Asked     Special Diet No     Back Care Not Asked     Exercise Yes     Comment: daily     Bike Helmet Not Asked     Seat Belt Not Asked     Self-Exams Not Asked   Social History Narrative     Not on file     Social Determinants of Health     Financial Resource Strain:      Difficulty of Paying Living Expenses:    Food Insecurity:      Worried About Running Out of Food in the Last Year:      Ran Out of Food in the Last Year:    Transportation Needs:      Lack of Transportation (Medical):      Lack of Transportation (Non-Medical):    Physical Activity:      Days of Exercise per Week:      Minutes of Exercise per Session:    Stress:      Feeling of Stress :    Social Connections:      Frequency of Communication with Friends and Family:      Frequency of Social Gatherings with Friends and Family:      Attends Restorationism Services:      Active Member of Clubs or Organizations:      Attends Club or Organization Meetings:      Marital Status:    Intimate Partner Violence:      Fear of Current or Ex-Partner:      Emotionally Abused:      Physically Abused:      Sexually Abused:          Medications  Allergies   Current Outpatient Medications   Medication Sig Dispense Refill     amLODIPine (NORVASC) 10 MG tablet Take 10 mg by mouth daily       losartan (COZAAR) 100 MG tablet Take 100 mg by mouth daily       Metoprolol Succinate 200 MG CS24 Take 200 mg by mouth daily        No Known Allergies                    Thank you for allowing me to participate in the care of your patient.      Sincerely,     Ilya Stevenson MD     [facility]  cc:   No referring provider defined for this encounter.

## 2021-08-27 ENCOUNTER — APPOINTMENT (OUTPATIENT)
Dept: LAB | Facility: CLINIC | Age: 41
End: 2021-08-27
Payer: COMMERCIAL

## 2021-08-27 LAB
ALBUMIN SERPL-MCNC: 3.9 G/DL (ref 3.5–5)
ALP SERPL-CCNC: 55 U/L (ref 45–120)
ALT SERPL W P-5'-P-CCNC: 40 U/L (ref 0–45)
ANION GAP SERPL CALCULATED.3IONS-SCNC: 11 MMOL/L (ref 5–18)
AST SERPL W P-5'-P-CCNC: 25 U/L (ref 0–40)
ATRIAL RATE - MUSE: 74 BPM
BILIRUB SERPL-MCNC: 0.7 MG/DL (ref 0–1)
BUN SERPL-MCNC: 13 MG/DL (ref 8–22)
CALCIUM SERPL-MCNC: 9 MG/DL (ref 8.5–10.5)
CHLORIDE BLD-SCNC: 107 MMOL/L (ref 98–107)
CO2 SERPL-SCNC: 22 MMOL/L (ref 22–31)
CREAT SERPL-MCNC: 0.89 MG/DL (ref 0.7–1.3)
DIASTOLIC BLOOD PRESSURE - MUSE: NORMAL MMHG
ERYTHROCYTE [DISTWIDTH] IN BLOOD BY AUTOMATED COUNT: 12.4 % (ref 10–15)
GFR SERPL CREATININE-BSD FRML MDRD: >90 ML/MIN/1.73M2
GLUCOSE BLD-MCNC: 136 MG/DL (ref 70–125)
HCT VFR BLD AUTO: 42.7 % (ref 40–53)
HGB BLD-MCNC: 14.9 G/DL (ref 13.3–17.7)
INTERPRETATION ECG - MUSE: NORMAL
MCH RBC QN AUTO: 30.5 PG (ref 26.5–33)
MCHC RBC AUTO-ENTMCNC: 34.9 G/DL (ref 31.5–36.5)
MCV RBC AUTO: 88 FL (ref 78–100)
P AXIS - MUSE: 34 DEGREES
PLATELET # BLD AUTO: 280 10E3/UL (ref 150–450)
POTASSIUM BLD-SCNC: 3.5 MMOL/L (ref 3.5–5)
PR INTERVAL - MUSE: 150 MS
PROT SERPL-MCNC: 7.4 G/DL (ref 6–8)
QRS DURATION - MUSE: 92 MS
QT - MUSE: 406 MS
QTC - MUSE: 450 MS
R AXIS - MUSE: 7 DEGREES
RBC # BLD AUTO: 4.88 10E6/UL (ref 4.4–5.9)
SODIUM SERPL-SCNC: 140 MMOL/L (ref 136–145)
SYSTOLIC BLOOD PRESSURE - MUSE: NORMAL MMHG
T AXIS - MUSE: 44 DEGREES
VENTRICULAR RATE- MUSE: 74 BPM
WBC # BLD AUTO: 6.6 10E3/UL (ref 4–11)

## 2021-08-27 PROCEDURE — 85027 COMPLETE CBC AUTOMATED: CPT | Performed by: INTERNAL MEDICINE

## 2021-08-27 PROCEDURE — 36415 COLL VENOUS BLD VENIPUNCTURE: CPT | Performed by: INTERNAL MEDICINE

## 2021-08-27 PROCEDURE — 80053 COMPREHEN METABOLIC PANEL: CPT | Performed by: INTERNAL MEDICINE

## 2021-08-30 DIAGNOSIS — I10 BENIGN ESSENTIAL HYPERTENSION: Primary | ICD-10-CM

## 2021-08-30 RX ORDER — CHLORTHALIDONE 25 MG/1
25 TABLET ORAL DAILY
Qty: 30 TABLET | Refills: 1 | Status: SHIPPED | OUTPATIENT
Start: 2021-08-30 | End: 2021-10-21

## 2021-09-04 ENCOUNTER — HEALTH MAINTENANCE LETTER (OUTPATIENT)
Age: 41
End: 2021-09-04

## 2021-09-14 ENCOUNTER — LAB (OUTPATIENT)
Dept: LAB | Facility: CLINIC | Age: 41
End: 2021-09-14
Payer: COMMERCIAL

## 2021-09-14 DIAGNOSIS — I10 BENIGN ESSENTIAL HYPERTENSION: ICD-10-CM

## 2021-09-14 LAB
ANION GAP SERPL CALCULATED.3IONS-SCNC: 13 MMOL/L (ref 5–18)
BUN SERPL-MCNC: 15 MG/DL (ref 8–22)
CALCIUM SERPL-MCNC: 9.4 MG/DL (ref 8.5–10.5)
CHLORIDE BLD-SCNC: 101 MMOL/L (ref 98–107)
CO2 SERPL-SCNC: 25 MMOL/L (ref 22–31)
CREAT SERPL-MCNC: 1.03 MG/DL (ref 0.7–1.3)
GFR SERPL CREATININE-BSD FRML MDRD: 89 ML/MIN/1.73M2
GLUCOSE BLD-MCNC: 93 MG/DL (ref 70–125)
POTASSIUM BLD-SCNC: 3.2 MMOL/L (ref 3.5–5)
SODIUM SERPL-SCNC: 139 MMOL/L (ref 136–145)

## 2021-09-14 PROCEDURE — 80048 BASIC METABOLIC PNL TOTAL CA: CPT

## 2021-09-14 PROCEDURE — 36415 COLL VENOUS BLD VENIPUNCTURE: CPT

## 2021-09-15 DIAGNOSIS — I10 BENIGN ESSENTIAL HYPERTENSION: Primary | ICD-10-CM

## 2021-09-15 RX ORDER — POTASSIUM CHLORIDE 1500 MG/1
40 TABLET, EXTENDED RELEASE ORAL DAILY
Qty: 60 TABLET | Refills: 3 | Status: SHIPPED | OUTPATIENT
Start: 2021-09-15 | End: 2021-09-23

## 2021-09-22 ENCOUNTER — LAB (OUTPATIENT)
Dept: LAB | Facility: CLINIC | Age: 41
End: 2021-09-22
Payer: COMMERCIAL

## 2021-09-22 DIAGNOSIS — I10 BENIGN ESSENTIAL HYPERTENSION: ICD-10-CM

## 2021-09-22 LAB
ANION GAP SERPL CALCULATED.3IONS-SCNC: 8 MMOL/L (ref 5–18)
BUN SERPL-MCNC: 13 MG/DL (ref 8–22)
CALCIUM SERPL-MCNC: 9.4 MG/DL (ref 8.5–10.5)
CHLORIDE BLD-SCNC: 103 MMOL/L (ref 98–107)
CO2 SERPL-SCNC: 27 MMOL/L (ref 22–31)
CREAT SERPL-MCNC: 1.03 MG/DL (ref 0.7–1.3)
GFR SERPL CREATININE-BSD FRML MDRD: 89 ML/MIN/1.73M2
GLUCOSE BLD-MCNC: 90 MG/DL (ref 70–125)
POTASSIUM BLD-SCNC: 3.4 MMOL/L (ref 3.5–5)
SODIUM SERPL-SCNC: 138 MMOL/L (ref 136–145)

## 2021-09-22 PROCEDURE — 36415 COLL VENOUS BLD VENIPUNCTURE: CPT

## 2021-09-22 PROCEDURE — 80048 BASIC METABOLIC PNL TOTAL CA: CPT

## 2021-09-23 RX ORDER — POTASSIUM CHLORIDE 1500 MG/1
40 TABLET, EXTENDED RELEASE ORAL 2 TIMES DAILY
Qty: 120 TABLET | Refills: 3 | Status: SHIPPED | OUTPATIENT
Start: 2021-09-23

## 2021-10-01 ENCOUNTER — LAB (OUTPATIENT)
Dept: LAB | Facility: CLINIC | Age: 41
End: 2021-10-01
Payer: COMMERCIAL

## 2021-10-01 DIAGNOSIS — I10 BENIGN ESSENTIAL HYPERTENSION: ICD-10-CM

## 2021-10-01 LAB
ANION GAP SERPL CALCULATED.3IONS-SCNC: 10 MMOL/L (ref 5–18)
BUN SERPL-MCNC: 14 MG/DL (ref 8–22)
CALCIUM SERPL-MCNC: 9.1 MG/DL (ref 8.5–10.5)
CHLORIDE BLD-SCNC: 106 MMOL/L (ref 98–107)
CO2 SERPL-SCNC: 25 MMOL/L (ref 22–31)
CREAT SERPL-MCNC: 0.96 MG/DL (ref 0.7–1.3)
GFR SERPL CREATININE-BSD FRML MDRD: >90 ML/MIN/1.73M2
GLUCOSE BLD-MCNC: 141 MG/DL (ref 70–125)
POTASSIUM BLD-SCNC: 3.6 MMOL/L (ref 3.5–5)
SODIUM SERPL-SCNC: 141 MMOL/L (ref 136–145)

## 2021-10-01 PROCEDURE — 36415 COLL VENOUS BLD VENIPUNCTURE: CPT

## 2021-10-01 PROCEDURE — 80048 BASIC METABOLIC PNL TOTAL CA: CPT

## 2021-10-04 NOTE — PROGRESS NOTES
Ilya Stevenson MD Caswell, Mallory J, RN  Should get echo now and then routine follow-up in 6 months

## 2021-10-21 DIAGNOSIS — I10 BENIGN ESSENTIAL HYPERTENSION: ICD-10-CM

## 2021-10-21 RX ORDER — CHLORTHALIDONE 25 MG/1
25 TABLET ORAL DAILY
Qty: 90 TABLET | Refills: 1 | Status: SHIPPED | OUTPATIENT
Start: 2021-10-21 | End: 2022-11-09

## 2021-11-02 ENCOUNTER — HOSPITAL ENCOUNTER (OUTPATIENT)
Dept: CARDIOLOGY | Facility: CLINIC | Age: 41
Discharge: HOME | End: 2021-11-02
Attending: INTERNAL MEDICINE | Admitting: INTERNAL MEDICINE
Payer: COMMERCIAL

## 2021-11-02 DIAGNOSIS — R07.9 CHEST PAIN, UNSPECIFIED TYPE: ICD-10-CM

## 2021-11-02 LAB — LVEF ECHO: NORMAL

## 2021-11-02 PROCEDURE — 93306 TTE W/DOPPLER COMPLETE: CPT | Mod: 26 | Performed by: INTERNAL MEDICINE

## 2021-11-02 PROCEDURE — C8929 TTE W OR WO FOL WCON,DOPPLER: HCPCS

## 2021-11-02 PROCEDURE — 255N000002 HC RX 255 OP 636: Performed by: INTERNAL MEDICINE

## 2021-11-02 RX ADMIN — PERFLUTREN 5 ML: 6.52 INJECTION, SUSPENSION INTRAVENOUS at 15:57

## 2021-11-04 ENCOUNTER — TELEPHONE (OUTPATIENT)
Dept: CARDIOLOGY | Facility: CLINIC | Age: 41
End: 2021-11-04

## 2021-11-04 DIAGNOSIS — I11.9 HYPERTENSIVE HEART DISEASE WITHOUT HEART FAILURE: ICD-10-CM

## 2021-11-04 DIAGNOSIS — I10 BENIGN ESSENTIAL HYPERTENSION: ICD-10-CM

## 2021-11-04 DIAGNOSIS — R07.9 CHEST PAIN ON EXERTION: Primary | ICD-10-CM

## 2021-11-04 NOTE — TELEPHONE ENCOUNTER
Rubina Rivas, RN   11/4/2021  2:13 PM CDT         Ilya Stevenson MD Caswell, Mallory J, RN  If he is still having chest pain should do stress MR             Previous Messages        ----- Message -----   From: Ruibna Rivas RN   Sent: 11/3/2021   4:52 PM CDT   To: Ilya Stevenson MD      Reading through your visit note with him in August- there was mentioned of a CCTA if kidney function was stable but then no mention of it in result notes and otherwise. Was this still something you wanted to pursue?   Thanks,   Allan               LM with patient to discuss results and recommendations and to assess chest discomfort. -[facility]      ----- Message from Ilya Stevenson MD sent at 11/3/2021  8:44 AM CDT -----  Probably just hypertensive heart          Called patient and updated on echocardiogram results and recommendations from [facility]. Explained to patient that if he is still having chest pains, he would recommend ischemic work up in the form of a stress MR. He verbalized understanding and would like to move forward with this. He is still have chest pains and he had them the other day driving to have the echo done and a few weeks ago at home. he states the pain intermittent at rest and sometimes occurs while he is out and about. It is left-sided and will last a few minutes then subside. MR stress order placed. Will update [facility] that this was done. -[facility] [place]               AMAURI Altman only- you will be signed off on stress MR- pt reports continued chest pains. BP is under good control however- last today was 115/68.  Thanks,  Allan

## 2021-12-07 ENCOUNTER — APPOINTMENT (OUTPATIENT)
Dept: URBAN - METROPOLITAN AREA CLINIC 260 | Age: 41
Setting detail: DERMATOLOGY
End: 2021-12-08

## 2021-12-07 DIAGNOSIS — Z87.2 PERSONAL HISTORY OF DISEASES OF THE SKIN AND SUBCUTANEOUS TISSUE: ICD-10-CM

## 2021-12-07 DIAGNOSIS — D22 MELANOCYTIC NEVI: ICD-10-CM

## 2021-12-07 PROBLEM — D22.5 MELANOCYTIC NEVI OF TRUNK: Status: ACTIVE | Noted: 2021-12-07

## 2021-12-07 PROCEDURE — 99212 OFFICE O/P EST SF 10 MIN: CPT

## 2021-12-07 PROCEDURE — OTHER COUNSELING: OTHER

## 2021-12-07 ASSESSMENT — LOCATION DETAILED DESCRIPTION DERM
LOCATION DETAILED: RIGHT INFERIOR UPPER BACK
LOCATION DETAILED: SUPERIOR LUMBAR SPINE

## 2021-12-07 ASSESSMENT — LOCATION SIMPLE DESCRIPTION DERM
LOCATION SIMPLE: RIGHT UPPER BACK
LOCATION SIMPLE: LOWER BACK

## 2021-12-07 ASSESSMENT — LOCATION ZONE DERM: LOCATION ZONE: TRUNK

## 2021-12-07 NOTE — HPI: DYSPLASTIC NEVUS F/U (HISTORY OF DYSPLASTIC NEVUS)
What Is The Reason For Today's Visit?: Follow Up Dysplastic Nevus
Additional History: Has not noticed any itching or repigmentation

## 2022-02-11 ENCOUNTER — OFFICE VISIT (OUTPATIENT)
Dept: FAMILY MEDICINE | Facility: CLINIC | Age: 42
End: 2022-02-11
Payer: COMMERCIAL

## 2022-02-11 VITALS
BODY MASS INDEX: 48.5 KG/M2 | DIASTOLIC BLOOD PRESSURE: 80 MMHG | OXYGEN SATURATION: 97 % | WEIGHT: 315 LBS | SYSTOLIC BLOOD PRESSURE: 130 MMHG | HEART RATE: 80 BPM

## 2022-02-11 DIAGNOSIS — K43.9 VENTRAL HERNIA WITHOUT OBSTRUCTION OR GANGRENE: Primary | ICD-10-CM

## 2022-02-11 PROBLEM — G47.33 OBSTRUCTIVE SLEEP APNEA: Status: ACTIVE | Noted: 2018-05-04

## 2022-02-11 PROBLEM — E78.2 MIXED HYPERLIPIDEMIA: Status: ACTIVE | Noted: 2022-02-11

## 2022-02-11 PROBLEM — I10 ESSENTIAL HYPERTENSION: Status: ACTIVE | Noted: 2018-03-28

## 2022-02-11 PROCEDURE — 99203 OFFICE O/P NEW LOW 30 MIN: CPT | Performed by: FAMILY MEDICINE

## 2022-02-11 NOTE — PROGRESS NOTES
"  Assessment & Plan     Ventral hernia without obstruction or gangrene    With is a ventral hernia which is fairly evident and bothering him more, and was sent to our colleagues over in general surgery.  We can get an opinion from them on fixing this or what else he might be able to do for it and follow-up with him as needed after that.    - Adult General Surg Referral; Future             BMI:   Estimated body mass index is 48.5 kg/m  as calculated from the following:    Height as of 8/26/21: 1.778 m (5' 10\").    Weight as of this encounter: 153.3 kg (338 lb).           No follow-ups on file.    Jorge Nielsen MD  [facility] NAOMI Rose is a 41 year old who presents for the following health issues     HPI     Patient is here today for concerns about a possible hernia.  He has a couple of spots on his abdominal wall which are uncomfortable for him.  One is just above the umbilicus and seems to be getting larger and more bothersome.  He also has an area just in the left upper abdominal quadrant area which he feels is not the same as the other side and will bother him once in a while as well.  Obviously he knows that he is morbidly obese this is certainly a problem with this.      Review of Systems   Constitutional, HEENT, cardiovascular, pulmonary, gi and gu systems are negative, except as otherwise noted.      Objective    /80 (BP Location: Left arm, Patient Position: Sitting, Cuff Size: Thigh)   Pulse 80   Wt (!) 153.3 kg (338 lb)   SpO2 97%   BMI 48.50 kg/m    Body mass index is 48.5 kg/m .  Physical Exam   GENERAL: healthy, alert and no distress  NECK: no adenopathy, no asymmetry, masses, or scars and thyroid normal to palpation  RESP: lungs clear to auscultation - no rales, rhonchi or wheezes  CV: regular rate and rhythm, normal S1 S2, no S3 or S4, no murmur, click or rub, no peripheral edema and peripheral pulses strong  MS: no gross musculoskeletal defects " noted, no edema  He has an umbilical hernia which is a bit uncomfortable to palpation but it is easily reducible.  He also has some vague tenderness to the abdominal wall in the left upper outer quadrant area.

## 2022-02-14 ENCOUNTER — OFFICE VISIT (OUTPATIENT)
Dept: SURGERY | Facility: CLINIC | Age: 42
End: 2022-02-14
Attending: FAMILY MEDICINE
Payer: COMMERCIAL

## 2022-02-14 VITALS — HEIGHT: 70 IN | BODY MASS INDEX: 45.1 KG/M2 | WEIGHT: 315 LBS

## 2022-02-14 DIAGNOSIS — E66.01 MORBID OBESITY (H): Primary | ICD-10-CM

## 2022-02-14 DIAGNOSIS — K43.9 VENTRAL HERNIA WITHOUT OBSTRUCTION OR GANGRENE: ICD-10-CM

## 2022-02-14 PROCEDURE — 99244 OFF/OP CNSLTJ NEW/EST MOD 40: CPT | Performed by: SURGERY

## 2022-02-14 ASSESSMENT — MIFFLIN-ST. JEOR: SCORE: 2456.18

## 2022-02-14 NOTE — LETTER
2/14/2022         RE: Milton Rivera  [address] [facility] [address]        Dear Colleague,    Thank you for referring your patient, Milton Rivera, to the [facility] [place]. Please see a copy of my visit note below.    History:  Milton Rivera is a 41 year old male who was referred to general surgery by Dr. Nielsen for evaluation of a ventral hernia.  He states that about 1 month ago when he was getting out of the shower he was looking in the mirror and noticed asymmetry to the left upper abdomen.  He then thought he felt something in that location.  He has underlying nagging pain there.  He denies any particular kind of activities that worsen the pain.  He does not do any lifting.  Coughing and sneezing do not cause any issues there.  When he went for an exam, he was actually found to have a lump just above his umbilicus.    Allergies:  Patient has no known allergies.    Past medical history:  Morbid obesity  DELMY  HTN  Hyperlipidemia  OA    Past surgical history:  Bilateral bunionectomy  Bilateral shoulder arthroscopy  Bilateral knee arthroscopy    Current medications:     amLODIPine (NORVASC) 10 MG tablet, Take 10 mg by mouth daily, Disp: , Rfl:      chlorthalidone (HYGROTON) 25 MG tablet, Take 1 tablet (25 mg) by mouth daily, Disp: 90 tablet, Rfl: 1     losartan (COZAAR) 100 MG tablet, Take 100 mg by mouth daily, Disp: , Rfl:      Metoprolol Succinate 200 MG CS24, Take 200 mg by mouth daily, Disp: , Rfl:      potassium chloride ER (KLOR-CON M) 20 MEQ CR tablet, Take 2 tablets (40 mEq) by mouth 2 times daily, Disp: 120 tablet, Rfl: 3    Family history:  Father - DM, CAD with h/o MI  Denies a family history of bleeding, clotting, or anesthesia problems.    Social History:  Reports that he has never smoked. He has never used smokeless tobacco. He reports current alcohol use - 1-2/month. He reports that he does not use drugs.  Works from  "home.  Has a pretty sedentary life.  Is unable to walk long distances due to weight and back pain.    Review of Systems:   General: No complaints or constitutional symptoms  Skin: No complaints or symptoms   Hematologic/Lymphatic: No symptoms or complaints  Psychiatric: No symptoms or complaints  Endocrine: No excessive fatigue, no hypermetabolic symptoms reported  Respiratory: No cough, shortness of breath, or wheezing  Cardiovascular: No chest pain or dyspnea on exertion  Gastrointestinal: As per HPI  Musculoskeletal: No recent injuries reported  Neurological: No focal neurologic defects reported.      Exam:  Ht 1.778 m (5' 10\")   Wt (!) 155.5 kg (342 lb 12.8 oz)   BMI 49.19 kg/m    Body mass index is 49.19 kg/m .  General : Alert, cooperative, appears stated age   Skin: Skin color, texture, turgor normal, no rashes or lesions   Lymphatic: No obvious adenopathy, no swelling   Eyes: No scleral icterus, pupils equal  HENT: No traumatic injury to the head or face, no gross abnormalities  Lungs: Normal respiratory effort, breath sounds equal bilaterally  Heart: Regular rate and rhythm  Abdomen: Obese, soft, structural asymmetry to the left upper abdomen where just below the rib cage the abdominal wall appears a bit more prominent.  There is no palpable mass or abnormality in this region.  No visible hernia.  With palpation just superior to the umbilicus is a firmness consistent with an epigastric hernia.  It is not reducible.  Musculoskeletal: Palpation to the thoracic and lumbar paraspinal muscles reveals significant asymmetry with the left being more prominent than the right.  Neurologic: Grossly intact    Imaging:   Pertinent images personally reviewed by myself and discussed with the patient.    Radiology reports:  CTA CHEST ABDOMEN PELVIS  9/3/2018 1:55 PM     INDICATION: Anterior chest pain with severely elevated blood pressure.. Evaluate for aortic dissection.  TECHNIQUE: Multiphase helical acquisition " through the chest, abdomen, and pelvis was performed including noncontrast, arterial phase, and delayed images before and after the administration of IV contrast. 2D and 3D reconstructions were performed by the   CT technologist. Dose reduction techniques were used.   IV CONTRAST: Iohexol (Omni) 100 mL  COMPARISON: 10/18/2012     FINDINGS:  CT ANGIOGRAM CHEST, ABDOMEN, AND PELVIS: Normal thoracic and abdominal aorta with no dissections in no aneurysms.     Normal great vessels off the aortic arch and abdominal mesenteric vessels and renal arteries are widely patent. Widely patent iliac arteries.     Normal pulmonary arteries with nothing for pulmonary emboli.     CHEST:  LUNGS AND PLEURA: Subtle infiltrate in the inferior lingula behind the heart. Suggestive of a very early/mild pneumonia. Lungs otherwise clear.     MEDIASTINUM: Negative. No adenopathy. No mediastinal hemorrhage.     ABDOMEN: Normal liver, spleen, kidneys, pancreas, and adrenal glands. No lymphadenopathy.     PELVIS: Mild diverticula sigmoid colon. Otherwise negative.     MUSCULOSKELETAL: Negative.     IMPRESSION:  CONCLUSION:  1.  Normal CTA with nothing for dissections, aneurysms, or pulmonary emboli.     2.  Very mild infiltrate in the inferior lingula concerning for a mild/early pneumonia.     3.  No other significant findings.    My interpretation:  Small umbilical and supraumbilical fat-containing hernias seen on this CT.    Assessment/Plan:   Milton Rivera is a morbidly obese 41-year-old male with an epigastic hernia as well as overall thoracic somatic dysfunction involving his thoracic spine and ribs.  In regards to his asymmetry and discomfort involving his chest wall, I think this musculoskeletal issue would benefit from evaluation by a chiropractor.     We then discussed the pathophysiology of hernias including surgical and non-operative management strategies.  We discussed both open and laparoscopic approaches, and the respective  risks and benefits of each approach.  We similarly discussed the role and specific risks associated with mesh placement and primary repair.  Unfortunately, his morbid obesity at this time will be prohibitive from an elective hernia repair.  He admits that he has struggled with his weight in the past.  He has been able to lose up to 70 pounds on his own.  At this time, he is the highest he has ever been.  He would be interested in meeting with our bariatric team to discuss further weight loss options.  I placed a referral and we assisted him in a consultation with Dr. Singh.    Once he has his BMI less than 40, we could re-discuss elective hernia repair.  I did review signs and symptoms of a strangulated hernia and the need for more urgent evaluation.  He will follow-up with myself once he has gotten closer to his weight loss goal.    Shoshana Mo DO  General Surgeon  [facility]  Surgery [address]  [place] [address]  Office: [phone]  Employed by - [employer]        Again, thank you for allowing me to participate in the care of your patient.        Sincerely,        Shoshana Mo DO

## 2022-02-14 NOTE — PROGRESS NOTES
History:  Milton Rivera is a 41 year old male who was referred to general surgery by Dr. Nielsen for evaluation of a ventral hernia.  He states that about 1 month ago when he was getting out of the shower he was looking in the mirror and noticed asymmetry to the left upper abdomen.  He then thought he felt something in that location.  He has underlying nagging pain there.  He denies any particular kind of activities that worsen the pain.  He does not do any lifting.  Coughing and sneezing do not cause any issues there.  When he went for an exam, he was actually found to have a lump just above his umbilicus.    Allergies:  Patient has no known allergies.    Past medical history:  Morbid obesity  DELMY  HTN  Hyperlipidemia  OA    Past surgical history:  Bilateral bunionectomy  Bilateral shoulder arthroscopy  Bilateral knee arthroscopy    Current medications:     amLODIPine (NORVASC) 10 MG tablet, Take 10 mg by mouth daily, Disp: , Rfl:      chlorthalidone (HYGROTON) 25 MG tablet, Take 1 tablet (25 mg) by mouth daily, Disp: 90 tablet, Rfl: 1     losartan (COZAAR) 100 MG tablet, Take 100 mg by mouth daily, Disp: , Rfl:      Metoprolol Succinate 200 MG CS24, Take 200 mg by mouth daily, Disp: , Rfl:      potassium chloride ER (KLOR-CON M) 20 MEQ CR tablet, Take 2 tablets (40 mEq) by mouth 2 times daily, Disp: 120 tablet, Rfl: 3    Family history:  Father - DM, CAD with h/o MI  Denies a family history of bleeding, clotting, or anesthesia problems.    Social History:  Reports that he has never smoked. He has never used smokeless tobacco. He reports current alcohol use - 1-2/month. He reports that he does not use drugs.  Works from home.  Has a pretty sedentary life.  Is unable to walk long distances due to weight and back pain.    Review of Systems:   General: No complaints or constitutional symptoms  Skin: No complaints or symptoms   Hematologic/Lymphatic: No symptoms or complaints  Psychiatric: No symptoms or  "complaints  Endocrine: No excessive fatigue, no hypermetabolic symptoms reported  Respiratory: No cough, shortness of breath, or wheezing  Cardiovascular: No chest pain or dyspnea on exertion  Gastrointestinal: As per HPI  Musculoskeletal: No recent injuries reported  Neurological: No focal neurologic defects reported.      Exam:  Ht 1.778 m (5' 10\")   Wt (!) 155.5 kg (342 lb 12.8 oz)   BMI 49.19 kg/m    Body mass index is 49.19 kg/m .  General : Alert, cooperative, appears stated age   Skin: Skin color, texture, turgor normal, no rashes or lesions   Lymphatic: No obvious adenopathy, no swelling   Eyes: No scleral icterus, pupils equal  HENT: No traumatic injury to the head or face, no gross abnormalities  Lungs: Normal respiratory effort, breath sounds equal bilaterally  Heart: Regular rate and rhythm  Abdomen: Obese, soft, structural asymmetry to the left upper abdomen where just below the rib cage the abdominal wall appears a bit more prominent.  There is no palpable mass or abnormality in this region.  No visible hernia.  With palpation just superior to the umbilicus is a firmness consistent with an epigastric hernia.  It is not reducible.  Musculoskeletal: Palpation to the thoracic and lumbar paraspinal muscles reveals significant asymmetry with the left being more prominent than the right.  Neurologic: Grossly intact    Imaging:   Pertinent images personally reviewed by myself and discussed with the patient.    Radiology reports:  CTA CHEST ABDOMEN PELVIS  9/3/2018 1:55 PM     INDICATION: Anterior chest pain with severely elevated blood pressure.. Evaluate for aortic dissection.  TECHNIQUE: Multiphase helical acquisition through the chest, abdomen, and pelvis was performed including noncontrast, arterial phase, and delayed images before and after the administration of IV contrast. 2D and 3D reconstructions were performed by the   CT technologist. Dose reduction techniques were used.   IV CONTRAST: Iohexol " (Omni) 100 mL  COMPARISON: 10/18/2012     FINDINGS:  CT ANGIOGRAM CHEST, ABDOMEN, AND PELVIS: Normal thoracic and abdominal aorta with no dissections in no aneurysms.     Normal great vessels off the aortic arch and abdominal mesenteric vessels and renal arteries are widely patent. Widely patent iliac arteries.     Normal pulmonary arteries with nothing for pulmonary emboli.     CHEST:  LUNGS AND PLEURA: Subtle infiltrate in the inferior lingula behind the heart. Suggestive of a very early/mild pneumonia. Lungs otherwise clear.     MEDIASTINUM: Negative. No adenopathy. No mediastinal hemorrhage.     ABDOMEN: Normal liver, spleen, kidneys, pancreas, and adrenal glands. No lymphadenopathy.     PELVIS: Mild diverticula sigmoid colon. Otherwise negative.     MUSCULOSKELETAL: Negative.     IMPRESSION:  CONCLUSION:  1.  Normal CTA with nothing for dissections, aneurysms, or pulmonary emboli.     2.  Very mild infiltrate in the inferior lingula concerning for a mild/early pneumonia.     3.  No other significant findings.    My interpretation:  Small umbilical and supraumbilical fat-containing hernias seen on this CT.    Assessment/Plan:   Milton Rivera is a morbidly obese 41-year-old male with an epigastic hernia as well as overall thoracic somatic dysfunction involving his thoracic spine and ribs.  In regards to his asymmetry and discomfort involving his chest wall, I think this musculoskeletal issue would benefit from evaluation by a chiropractor.     We then discussed the pathophysiology of hernias including surgical and non-operative management strategies.  We discussed both open and laparoscopic approaches, and the respective risks and benefits of each approach.  We similarly discussed the role and specific risks associated with mesh placement and primary repair.  Unfortunately, his morbid obesity at this time will be prohibitive from an elective hernia repair.  He admits that he has struggled with his weight in  the past.  He has been able to lose up to 70 pounds on his own.  At this time, he is the highest he has ever been.  He would be interested in meeting with our bariatric team to discuss further weight loss options.  I placed a referral and we assisted him in a consultation with Dr. Singh.    Once he has his BMI less than 40, we could re-discuss elective hernia repair.  I did review signs and symptoms of a strangulated hernia and the need for more urgent evaluation.  He will follow-up with myself once he has gotten closer to his weight loss goal.    Shoshana Mo DO  General Surgeon  [facility]  Surgery [address]  [place] [address]  Office: [phone]  Employed by - [facility]

## 2022-02-19 ENCOUNTER — HEALTH MAINTENANCE LETTER (OUTPATIENT)
Age: 42
End: 2022-02-19

## 2022-04-08 ENCOUNTER — OFFICE VISIT (OUTPATIENT)
Dept: SURGERY | Facility: CLINIC | Age: 42
End: 2022-04-08
Payer: COMMERCIAL

## 2022-04-08 VITALS
WEIGHT: 315 LBS | SYSTOLIC BLOOD PRESSURE: 130 MMHG | BODY MASS INDEX: 45.1 KG/M2 | HEIGHT: 70 IN | DIASTOLIC BLOOD PRESSURE: 78 MMHG

## 2022-04-08 DIAGNOSIS — E66.01 MORBID OBESITY (H): Primary | ICD-10-CM

## 2022-04-08 DIAGNOSIS — I10 HYPERTENSION, UNSPECIFIED TYPE: ICD-10-CM

## 2022-04-08 DIAGNOSIS — K43.9 VENTRAL HERNIA WITHOUT OBSTRUCTION OR GANGRENE: ICD-10-CM

## 2022-04-08 DIAGNOSIS — G47.33 OSA (OBSTRUCTIVE SLEEP APNEA): ICD-10-CM

## 2022-04-08 PROCEDURE — 99215 OFFICE O/P EST HI 40 MIN: CPT | Performed by: EMERGENCY MEDICINE

## 2022-04-08 NOTE — LETTER
"    2022         RE: Milton Rivera  [address]   [facility] [place] [address]        Dear Colleague,    Thank you for referring your patient, Milton Rivera, to the [facility] [place]. Please see a copy of my visit note below.    65 minutes spent on the date of the encounter doing chart review, history and exam, documentation and further activities per the note    New Bariatric Surgery Consultation Note    2022    RE: Milton Rivera  MR#: [id number]  : 1980      Referring provider:       2022   Who referred you? Dr. Mo       Chief Complaint/Reason for visit: evaluation for possible weight loss surgery      I had the pleasure of seeing your patient, Milton Rivera, to evaluate his obesity and consider him for possible weight loss surgery. As you know, Milton Rivera is 41 year old.  He has a height of 5' 10\", a weight of 340 lbs 6.4 oz, and calculated Body mass index is 48.84 kg/m ..  Today we discussed our Bariatric Surgery program to address their weight related health. he has not yet viewed our online seminar prior to this visit and on discussion today, has decided to defer the surgical program at this time. We discussed the outline of the program today, the basic needs of a sleeve gastrectomy procedure and after care/follow up. We also explored non surgical options for weight management, including medication support, and he's hesitant at this point to embrace appetite suppressant medication and wishes to focus on mindful dietary therapy and exercise therapy. His exercise is limited by chronic low back pain and recommendations were given today for possible additional therapy which he plans to contemplate. Overall, today's visit was mostly a fact finding mission on his part and we'll plan to reassess the different options available to him as he progresses in our program and adjust his weight management tools " based on his preferences and alter based on results. Certainly, with his morbid obesity with co-morbid DELMY, hyperlipidemia, hypertension, hepatic steatosis and abdominal hernia issues, bariatric surgery would off the most reliable/durable weight reduction given his BMI of 48.8 today.    He was referred to our program by Dr. Mo due to abdominal wall hernia issues that would not be durably repaired at his current BMI and given his comorbid hypertension, GERD, DELMY, arthritis and hyperlipidemia, Bariatric Surgery  would provide the most reliable and durable way to improve his weight related health conditions.        Assessment & Plan   Problem List Items Addressed This Visit        Digestive    Morbid obesity (H)         Plan:  1. Welcome to your weight loss season. To start you felt a non surgical approach was what you desired but I'd encourage you to view the seminar below to round out your knowledge of the surgical option which would likely provide the most reliable/durable way to a 25-30% total body weight reduction.    2. IN the meanwhile, we'll focus on dietary/mindfulness and exercise tools. You've declined medication at this point in time but feel free to reach out if changing your mind before our follow up.  Low dose phentermine or Wegovy/Saxenda would be our 2 top options (see below for all meds but Contrave would likely interact with your metoprolol and slow heart rate too much to feel well).     3. Start by focusing on getting your first meal of the day within 2-3 hours of waking, and each meal coming every 4.5-6 hours and containing 30grams of lean protein per meal with one mid afternoon snack of veggie/fruit with some low protein.     4. Follow up with dietician.    5. Check labs today (will get later as had to go).     6. Recheck with me in 4-6 weeks.      HISTORY OF PRESENT ILLNESS:  Weight Loss History Reviewed with Patient 4/5/2022   How long have you been overweight? Since late 20's to early 40's    What is the most that you have ever weighed? 336   What is the most weight you have lost? 72   I have tried the following methods to lose weight Watching portions or calories, Exercise   I have tried the following weight loss medications? (Check all that apply) None   Have you ever had weight loss surgery? No       CO-MORBIDITIES OF OBESITY INCLUDE:     4/5/2022   I have the following health issues associated with obesity: Pre-Diabetes, High Blood Pressure, Sleep Apnea, Fatty Liver   abdominal hernia     PAST MEDICAL HISTORY:  Past Medical History:   Diagnosis Date     Arthritis     Knees     COVID 01/2022     Gastro-oesophageal reflux disease      Hyperlipidemia      Hypertension      DELMY (obstructive sleep apnea)     Uses CPAP     PONV (postoperative nausea and vomiting)      Sleep apnea     Uses CPAP       PAST SURGICAL HISTORY:  Past Surgical History:   Procedure Laterality Date     ARTHROSCOPY KNEE RT/LT Bilateral      ARTHROSCOPY SHOULDER RT/LT Bilateral      BUNIONECTOMY Left 11/21/2014    Procedure: BUNIONECTOMY;  Surgeon: Rome Munoz DPM;  Location: [facility] OR     BUNIONECTOMY RT/LT Right        FAMILY HISTORY:   Family History   Problem Relation Age of Onset     Diabetes Father      Coronary Artery Disease Father         triple bypass     Myocardial Infarction Father      C.A.D. Maternal Grandfather         MI at age 72       SOCIAL HISTORY:   Social History Questions Reviewed With Patient 4/5/2022   Which best describes your employment status (select all that apply) I work full-time   If you work, what is your occupation?    Which best describes your marital status:    Do you have children? Yes   Who do you have in your support network that can be available to help you for the first 2 weeks after surgery? My wife and family   Who can you count on for support throughout your weight loss surgery journey? My wife and family   Can you afford 3 meals a day?  Yes   Can you afford 50-60  dollars a month for vitamins? Yes   16 yo daughter.    HABITS:     4/5/2022   How often do you drink alcohol? 2-4 times a month   If you do drink alcohol, how many drinks might you have in a day? (one drink = 5 oz. wine, 1 can/bottle of beer, 1 shot liquor) 7 to 9   Have you ever used any of the following nicotine products? No   Have you or are you currently using street drugs or prescription strength medication for which you do not have a prescription for? No   Do you have a history of chemical dependency (alcohol or drug abuse)? No         PSYCHOLOGICAL HISTORY:   Psychological History Reviewed With Patient 4/5/2022   Have you ever attempted suicide? Never.   Have you had thoughts of suicide in the past year? No   Have you ever been hospitalized for mental illness or a suicide attempt? Never.   Do you have a history of chronic pain? No   Have you ever been diagnosed with fibromyalgia? No   Are you currently seeing a therapist or counselor?  No   Are you currently seeing a psychiatrist? No       ROS:     4/5/2022   Skin:  Leg swelling   HEENT: None of these   Musculoskeletal: Joint Pain, Back pain, Swelling of legs, Arthritis   Cardiovascular: Shortness of breath with activity   Pulmonary: Shortness of breath with activity, Snoring, Awaken from sleep to catch your breath, People have told me I stop breathing while asleep   Gastrointestinal: None of the above   Genitourinary: None of the above   Hematological: None of the above   Neurological: None of the above       EATING BEHAVIORS:     4/5/2022   Do you currently binge eat (eat a large amount of food in a short time)? No   Are you an emotional eater? No   Do you get up to eat after falling asleep? No       EXERCISE:     4/5/2022   How often do you exercise? Never   What keeps you from being more active?  Pain, I should be more active but I just have not gotten around to it   low back pain limits him.   Hobbies: golf (back limited). carted    MEDICATIONS:  Current  "Outpatient Medications   Medication Sig Dispense Refill     amLODIPine (NORVASC) 10 MG tablet Take 10 mg by mouth daily       chlorthalidone (HYGROTON) 25 MG tablet Take 1 tablet (25 mg) by mouth daily 90 tablet 1     losartan (COZAAR) 100 MG tablet Take 100 mg by mouth daily       Metoprolol Succinate 200 MG CS24 Take 200 mg by mouth daily       potassium chloride ER (KLOR-CON M) 20 MEQ CR tablet Take 2 tablets (40 mEq) by mouth 2 times daily 120 tablet 3       ALLERGIES:  No Known Allergies    10/1/21 labs w/ glucose 141, BMP o/w normal. ECHO 11/2/21 at EF of 65% and no WMA.     PHYSICAL EXAM:  Objective    /78 (BP Location: Right arm, Patient Position: Sitting)   Ht 1.778 m (5' 10\")   Wt (!) 154.4 kg (340 lb 6.4 oz)   BMI 48.84 kg/m    /78 (BP Location: Right arm, Patient Position: Sitting)   Ht 1.778 m (5' 10\")   Wt (!) 154.4 kg (340 lb 6.4 oz)   BMI 48.84 kg/m    Body mass index is 48.84 kg/m .  Physical Exam   GENERAL: healthy, alert and no distress. Bearded. Thick neck.   NECK: no adenopathy, no asymmetry, masses, or scars and thyroid normal to palpation  RESP: lungs clear to auscultation - no rales, rhonchi or wheezes  CV: regular rate and rhythm, normal S1 S2, no S3 or S4, no murmur, click or rub, no peripheral edema and peripheral pulses strong  ABDOMEN: central adiposity with  soft, nontender,  MS: no gross musculoskeletal defects noted, no edema today to palpation of pretibial skin.     Sincerely,     Itz Singh MD            Again, thank you for allowing me to participate in the care of your patient.        Sincerely,        Itz Snigh MD    "

## 2022-04-08 NOTE — PATIENT INSTRUCTIONS
"Plan:  1. Welcome to your weight loss season. To start you felt a non surgical approach was what you desired but I'd encourage you to view the seminar below to round out your knowledge of the surgical option which would likely provide the most reliable/durable way to a 25-30% total body weight reduction.    2. IN the meanwhile, we'll focus on dietary/mindfulness and exercise tools. You've declined medication at this point in time but feel free to reach out if changing your mind before our follow up.  Low dose phentermine or Wegovy/Saxenda would be our 2 top options (see below for all meds but Contrave would likely interact with your metoprolol and slow heart rate too much to feel well).     3. Start by focusing on getting your first meal of the day within 2-3 hours of waking, and each meal coming every 4.5-6 hours and containing 30grams of lean protein per meal with one mid afternoon snack of veggie/fruit with some low protein.     4. Follow up with dietician.    5. Check labs today.    6. Recheck with me in 4-6 weeks.      What makes a person succeed with dramatic and sustained weight loss?    It's being at the right point in your life where you feel the need to lose the weight, not because anyone told you so but because of a voice inside of you that says, \"I am ready for this\".  You're now at a point where you may be feeling anxious, irritable and when you look in the mirror you do not recognize the person looking back.  Your healthy self is buried somewhere in that reflexion and you want to free it again.  This is the sort of motivation that leads to success, and it comes from you.    Because the only person that can lose that extra weight is you, I like my patients to focus on the mindset of being in Weight Loss Season.  This gives you permission to make the changes necessary to be consistent with the diet/activity and behavior changes that lead to successful, healthy weight loss.  Nearly any diet plan can work " "for weight loss, but keeping it healthy and nutrient based to prevent deficiencies/hair loss/fatigue or irritability is vital.  If you have a plan you want to try, we'll work with you to make sure no adjustments are needed to keep you healthy through your weight loss season and working with our Bariatric Dieticians you'll be given expert guidance to customize your diet plan to suit your particular needs. If you don't have your own ideas in mind, we are always happy to suggest well researched and validated plans that provide enough food to prevent hunger but still tap into your excess fat reserves and lose weight in a sustainable fashion.  There is great evidence that lean protein/healthy fat intake with good fiber intake while minimizing simple starches/carbs produces reliable/sustainable weight loss in most people. But some feel more connected to an intermittent fasting/fast mimicking or ketogenic diet.  These protocols can be hard for many to stick with and that's why we prefer the protein/healthy fat focused diets but if these alternative strategies appeal to you, we can work with you to optimize your knowledge and results with these tools.    Losing weight is a temporary commitment, but you need to be \"All In\" to have a good weight loss season.  To avoid frustration, you have to be willing to be on track 19 out of 20 days or even better than that. But, weight loss season is generally only 4-8 months in length. After that length of time, it can be hard to maintain a negative calorie balance and our brain, motivations and metabolism will usually bring you to a plateau that cannot be broken in this modern world where other commitments start to take priority. That's when we look to stabilize the weight loss you've achieved.  If you've reached your goal by that time, fantastic, and job well done.  If there is more to go, then after a few months of stabilization, we can usually attack that previous plateau and break " "into new territory.    Because of this time limitation, we want to really get to work right away and get into a sustainable routine ASAP.  One of the best predictors of how much weight you're going to lose throughout the season is how much you lose in the first 6 weeks, so prepare well and jump in with both feet.      Occasionally, people may feel like they cannot commit fully to the changes necessary and may want to change one thing at a time and \"get used to\" the idea of losing weight.  That is OK because that is where they are in their life, and they cannot fully commit for any number of reasons.  It's part of that internal motivation and they just haven't reached PRIORTY NUMBER ONE status yet. It's possible that what they need is more time to reach that point and I am always willing to work with people that want to \"dabble\", but understand, the amount of success obtained with half measures, is much less than half results. Behavior Change cannot occur until we prepare our minds, bodies and environment for what is to come, action!    As you go through your plan, look for things to keep your motivation rolling.  The most successful people have a goal or target/reward that they are working towards.  Having a reward that celebrates your new fitness, mobility and energy is the best sort because it will encourage you to do well with the weight maintenance phase and long term lifestyle changes that promotes keeping the weight off for the long term.  Usually, \"getting healthier\" or improving blood tests or losing weight so your clothes fit better is not as internally motivating as having a tangible reward.  A good weight loss season reward is one that isn't food based, is affordable, but something special:  Something you won't be getting/doing unless you achieve your goal.   It s important to keep to the rule of success:  in order to get the reward, your goal MUST be achieved. Write this reward down, where you can look " at it daily and keep it in the front of your mind as you go through your weight loss season and it will help keep you on track.    Tools that help change behavior are vital for success. The most studied and most supported tool for weight loss is nothing more than writing down your food and weight every day.  Every Day.  Accurately and completely.  When you commit to weight loss season, this information tells you whether you're getting ENOUGH food to fuel your weight loss properly as well as teaches you the interaction between different foods you eat and how your body responds with weight loss.  You'll see that sometimes after a heavy workout you don't see the scale move until 2-3 days later.  How saltier meals (chili for example) may make you retain water for 4-5 days before you see the weight come off, you'll get used to the mini-plateaus that develop after a good 3-8 lb drop in weight as well as how you break through if you keep working the diet as you should.    Weight loss is not a linear process, there are mini ups and downs.  Learning how your body loses weight and getting comfortable with that is very rewarding. The act of writing words on paper also solidifies your will power and commitment to the season of weight loss and that by itself changes your brain chemistry/appetite, motivation and prepares you for maximal success.     Behavior change is all about getting into a new routine.  The old habits and routine have to change because without changing the circumstances of how you gained your weight, it's unlikely you'll enjoy satisfying results. If you have snacking habits, like every time you walk through the kitchen you grab a little something, well, that habit has to change and be replaced by a new habit.  It can be something as simple as keeping a doodle pad on the counter that you make a few scribbles and then walk through the kitchen having not opened the cupboard, or starting with a glass of water and  "leaving the kitchen without anything else, or checking your food journal to see how many calories you have left for the day.  Boredom is the enemy as are the old habits. Break new ground and try to push those old habits into a deep hole.  There are apps/counseling options available that can help with some of the day to day urges/behaviors if you're struggling. One commercial product that does a good job is [employer].  Unfortunately, there is a subscription fee.    Finally, exercise always helps.  While not mandatory to lose weight, every little bit helps and exercise has so many other benefits that to not work it into your plan is to miss out on all the mood, sleep, stress and general health benefits that come from making yourself a little short of breath and sweaty at least 3-4 days out of the week.  The metabolism and calorie burn benefits aside, almost every chronic ailment in medicine gets better with proper, aerobic exercise.  Allow yourself to start slow and let your body prepare itself to accept harder training 4-6 weeks down the road, but start now and commit to a plan.  Whether you have the means to hire a , join a gym or just walk out your front door or go down to your basement for a video workout, get into a exercise routine and  after 3 weeks of at least 3 times a week exercise you should be at a point where you can slowly start ramping up 10% each week to our goal of at least 150-300minutes weekly of aerobic exercise and at least twice weekly resistance training/strenghtening with weights/bands or body weight exercises.     I am a big fan of modifying the free training plan, \"Couch to 5k\", for almost all of my patients. Just type it into [employer] or look it up on your smart phone jonathan store.  To modify the plan,  you can use the training plan for whatever aerobic activity you do (bike/treadmill/elliptical/rower/pool/etc). During the \"jog\" intervals, you just move a little faster or harder or increase the " "tension or incline.  You use those little intervals to switch up the workout and recruit more muscles and pump the blood a little more and then recover again in the \"walk\" intervals by slowing down, decreasing the incline or turning down the tension.  3-4 days a week is not that much to ask and the benefits are enormous.  Start slow and develop the base from which you can then build on and reduce the risk of injury.  It's much more important 2-4 months from now to be enjoying your exercise then it is to over exert yourself at the start and hurt yourself.  Starting slowly allows your body to accept the training better down the road when the exercise becomes crucial for weight maintenance.  Without exercise down the road during your maintenance phase, all this hard work you are about to put in can be undone. It usually takes about 100-300 calories a day of exercise to maintain a weight loss and our focus during weight loss season is to generate the routine/activities and hobbies that make that enjoyable/sustainable.    Thanks for taking this first and most important step in your weight loss season.  Commit to it and we will cheerlead you all the way to success.  When things get tough or off track we'll offer guidance and analysis and when you reach your goal we'll celebrate your success.  In the end, it is all about your success, your health and what you do with it.      Itz Singh MD  [facility]  [phone]          LEAN PROTEIN SOURCES  Getting 20-30 grams of protein, 3 meals daily, is appropriate for most people, some need more but more than about 40 grams per meal is not useful.  General rule is drinking one ounce of water per gram of protein eaten over the course of the day:  70 grams of protein each day, drink 70 oz of water.  Protein Source Portion Calories Grams of Protein                           Nonfat, plain Greek yogurt    (10 grams sugar or less) 3/4 cup (6 oz) "  12-17   Light Yogurt (10 grams sugar or less) 3/4 cup (6 oz)  6-8   Protein Shake 1 shake 110-180 15-30   Skim/1% Milk or lactose-free milk 1 cup ( 8 oz)  8   Plain or light, flavored soymilk 1 cup  7-8   Plain or light, hemp milk 1 cup 110 6   Fat Free or 1% Cottage Cheese 1/2 cup 90 15   Part skim ricotta cheese 1/2 cup 100 14   Part skim or reduced fat cheese slices 1 ounce 65-80 8     Mozzarella String Cheese 1 80 8   Canned tuna, chicken, crab or salmon  (canned in water)  1/2 cup 100 15-20   White fish (broiled, grilled, baked) 3 ounces 100 21   [place]/Tuna (broiled, grilled, baked) 3 ounces 150-180 21   Shrimp, Scallops, Lobster, Crab 3 ounces 100 21   Pork loin, Pork Tenderloin 3 ounces 150 21   Boneless, skinless chicken /turkey breast                          (broiled, grilled, baked) 3 ounces 120 21   [place], [place], [place], and Venison 3 ounces 120 21   Lean cuts of red meat and pork (sirloin,   round, tenderloin, flank, ground 93%-96%) 3 ounces 170 21   Lean or Extra Lean Ground Turkey 1/2 cup 150 20   90-95% Lean [place] Burger 1 clarisa 140-180 21   Low-fat casserole with lean meat 3/4 cup 200 17   Luncheon Meats                                                        (turkey, lean ham, roast beef, chicken) 3 ounces 100 21   Egg (boiled, poached, scrambled) 1 Egg 60 7   Egg Substitute 1/2 cup 70 10   Nuts (limit to 1 serving per day)  3 Tbsp. 150 7   Nut [place] (peanut, almond)  Limit to 1 serving or less daily 1 Tbsp. 90 4   Soy Burger (varies) 1  15   Garbanzo, Black, Olsen Beans 1/2 cup 110 7   Refried Beans 1/2 cup 100 7   Kidney and Lima beans 1/2 cup 110 7   Tempeh 3 oz 175 18   Vegan crumbles 1/2 cup 100 14   Tofu 1/2 cup 110 14   Chili (beans and extra lean beef or turkey) 1 cup 200 23   Lentil Stew/Soup 1 cup 150 12   Black Bean Soup 1 cup 175 12         Example Meal Plan for a 5364-7969 Calorie Diet:  Danny, add 30% to portions for your personal needs.    In order to  fuel your weight loss properly and avoid hunger-induced overeating later in the day, for your height and weight, you will enjoy the most success by following the diet below or similar with adjustments based on your particular tastes and preferences.  Exercise may influence speed, amount of weight loss further.     I recommend getting into a meal routine and keeping it similar day to day in the beginning so you don t have to think too hard about what you re going to make/eat.  Keep snacks healthy, ideally containing protein and some vegetables.  Non-processed food is preferable to packaged items.  Eat at least a few crunchy green vegetables if having a snack, which should be 2-3 hours after your mealtimes(prepare these ahead of time for ease of use).  Drink 64 oz -80 oz of water daily for most, some of you will need more and we'll discuss it at your visit if that is the case.      When changing our diet,  we can often mistake thirst for hunger or just have some distracted eating habits that we need to break free from ('bored/mindless eating', screen time,work, driving,etc).  A glass of water and reconsideration of our hunger is often all that is needed.  Having the urge is not the problem, but watching it pass by without acting on it is the goal.    If you re having hunger problems, add a protein drink/snack to your morning hours or afternoon snack with at least 20grams of protein and not too much sugar (under 10g).  A carton of higher protein/low sugar yogurt can work as well.  If the urge to snack is overwhelming and not satiated, try going for a 10 minute walk/exercise, come home and drink a glass of water and if still hungry, have a  calorie snack (handful of raw/sprouted nuts, veggies and string cheese, protein bar, etc).  Savor it.    It is better to have a large breakfast, a moderate lunch and a smaller dinner to fuel your day.  People lose 10-15% more weight during their weight loss season with this  strategy. Optimizing your protein intake at each meal will further keep you more satisfied while eating less food overall.  Getting exercise in early has also been shown to offer the best results (before breakfast ideally but anytime is the right time to exercise if that is not an option for you).    To make sure you re getting adequate vitamins and minerals during weight loss, I recommend one complete multivitamin a day of your choice.  Consider a probiotic and taking some vitamin D 2000 IU daily.    Let supper be your last meal of the day and ideally try to have at least 12 hours between supper and breakfast the next day to tap into some beneficial overnight fasting dynamics.  Midnight snacks need to go away. Water in the evening is fine, unsweetened, non caffeinated herbal tea is helpful as well.  Consolidating your meals within a 8-12 hour period of your day will help tap into these additional metabolic benefits and tends to keep your appetite up for breakfast, further helping to stay on track.  For most of my patients, I don't recommend an intermittent fasting style diet (many find it hard to fit in their lifestyle) but an overnight fast is very doable for most patients and helps regulate our hunger drives a little better.  This makes it very important to nail good intake at all three meals to feel satisfied/energized and still lose weight.      If evening snacking desires are high, consider a glass of fiber supplement for some additional fullness (metamucil or similar). Most of us don't get the 25-30 grams per day of fiber that promotes good gut health/satiety.  Benefiber, metamucil, citrucel are reasonable/affordable options for most people.  Inulin, chicory, psyllium husk are reasonable options but start slow and low in the dose to avoid gas/bloating until your gut gets acclimated (ramping up to 5-10 grams per day of supplemental fiber after 3-4 weeks if needed).      Example Meal Plan:  Breakfast: 450-475  Calories  1 egg cooked on low in olive oil:   calories.  5oz Greek Yogurt (Fage plain classic: ~150 sahara)  Handful of Berries of your choice (about a calorie per berry or 20-40cal per handful)    cup(cooked) of  old fashioned oatmeal or 1/2 cup(cooked) steel cut oats. (150 sahara)  Sprinkle amount of brown sugar and a pat of butter. (40 sahara)  Glass of  Water  Black coffee or unsweetened Tea (0calories).      2-3 hours Later Snack: (195 calories).  Glass of water  One string Cheese (80 calories) or 4 oz creamed cottage cheese (115 calories) with  Crunchy Celery sticks (less than 10 calories per large stalk) 2 stalks. (20 calories)    of a  Large Banana or   of a Large Apple (60 calories):  eat second half at lunch or afternoon snack.     Lunch:300 -350 calories   Chicken Breast  (baked/broiled/roasted/grilled)  4-6 oz.  (125-180 sahara), BBQ sauce/hot sauce/mustard/seasoning is free. Just use a reasonable amount. Or a can of tuna with 1 tablespoon mayonnaise.  Salad: lettuce, any other veggies (cucumbers, green peppers/celery you like and a small drizzle of dressing to just flavor.  Go as big on the veggies as you like,  as they are practically calorie free.   A whole, 8 inch cucumber is 45 calories, a whole green pepper is 23 calories, a stalk of celery is 9 calories.  [place] Dressing is 60 calories per tablespoon..so moderate your desired dressing or do a drizzle of olive oil and splash of balsamic vinegar on top,  Total calories unlikely to be over 150 even with dressing.  Glass of Water.    Option for lunch is meal replacement protein drink/smoothie.  Need at least 20 grams of protein and eat the rest of your apple/banana from the morning snack.      Afternoon Snack: 150-200 calories   Cheese Stick or cottage cheese again  and a fresh fruit OR  Granola Bar (protein Bar acceptable if under 200 calories OR  Homemade smoothies:  8oz skim milk,  a handful of berries (fresh or frozen and a serving of protein  powder such as BiPro or Aishwarya sWhey for example.  If you don't like dairy, make with 8oz water, one small banana, handful of berries and the protein powder, add any veggies you want as well:  roughly 200 calories.   Glass of Water    Dinner: 325 calories  4oz of fresh, [place] salmon.  Broiled (salt/pepper/dill) for about 8-8.5 minutes (200calories) or  4oz filet mignon steak or sirloin steak  Salad or vegetable sautéed lightly in olive oil or   Broccoli 1.5  cups chopped and steamed  or micro-waved in a little water (75 calories)  Glass of Water,    Cup of herbal tea (unsweetened, caffeine free)      Herbs and seasonings are encouraged to flavor your foods/vegetables.  Make your food delicious.      Tips for Success:  1.  Prepare proteins ahead of time (broil chicken breasts in bulk so you can grab and go), steel cut oats/lentils can be stored in casserole dish/bowl in the fridge for quick scoop in the morning and rewarm in microwave, make use of crock pot recipes (watch salt content).  Making meals that cover 3-4 future meals is an easy way to stay on track.  2.  Drink a 8-12 oz glass of water every 2-3 hours when awake.  We often mistake hunger for thirst, especially when losing weight.  3. Remember your Reward and Motivation when things get hard.  4.  Weigh yourself every morning and record, you'll stay on track better and learn how our biorhythms, diet and elimination patterns show up on the scale. Don't worry about 1 or 2 day patterns, but when on track you'll notice good trend downward of weight over 3-4 day segments.  Plateaus tend to resolve after 4-8 days in most cases if you stay consistent with your plan.  These are natural and part of weight loss, even if you're perfect with your plan execution.  5. Call if problems/concerns.  [employer] is a great tool to stay in touch and provide weekly outside accountability. Check in with questions or if you want to brag.  6.  Find a handful of meals/foods that keep  "you on track and feeling good and get into a routine that is sustainable for you.  It's OK to have a routine that works for you.  7.  Consider taking a complete multivitamin just to make sure all micronutrients are adequate during weight loss.  8. If losing hair/brittle nails it usually means you are not taking enough protein.  Minimum goal is 60 grams daily of protein for smaller women, 80 grams a day for men. Consider taking Biotin as supplement or a \"Hair and Nail\" multivitamin.        If you are planning to have your surgery at [facility], please complete this online video seminar series.    Throughout this six-video series, you will be introduced to some members of our bariatric team, gain a better understanding of what obesity is, learn about the surgery process, review the benefits and risks of surgery, and see a glimpse of life after surgery.      Completion time of all six videos is about 25 minutes and you can watch them each separately as time allows.     Start by clicking on the link below and keep scrolling down to the [facility] Weight Loss Surgery Video.    [email or web address]    Please let us know if you have any questions or issues.      MEDICATIONS FOR WEIGHT LOSS  There are several medications available to assist us in weight loss.  By themselves, without compliance to a change in diet and increase in movement/activity these medications are disappointing in their results. However, combined with a closely monitored program of diet change and exercise they can be very effective in controlling appetite and boosting initial weight loss.  All weight loss medications need continual re-evaluation for efficacy as their side effects and health benefits fail to be worthwhile if a person is not continuing to lose weight or in maintaining their healthy weight.  Some weight loss medications are scheduled drugs, meaning there is at least a theoretical possibility for developing " addiction to them but in practice this is rare.  We do anticipate coming off meds in the future after stabilization of weight loss is assurred.  Finally, a tolerance can develop and people s perceived efficacy of medication can diminish.  In communication with your physician, it may be appropriate to intermittently take a break from these medications and then restart again (few weeks off then restart again) if a plateau is reached that cannot be broken through.  Each person can respond to a medication differently and to be a good option for you, it will need to be affordable, effective and well tolerated with minimal side effects.    In most cases, weight loss progress after one month and three months will be obtained and if a patient is not reaching the satisfactory progress towards weight loss, the medications may be discontinued.  The thought is that if a person is taking a weight loss medication and not receiving the potential health benefit of that drug, the side effects are not worthwhile and use should be discontinued.  On the flip side, there are many people on some weight loss medications for years because it continues to be an effective tool in their weight management and they are tolerating the medication without any long-term side effects.  Each person's response and purpose will be evaluated.      PHENTERMINE (Adipex): approved in 1959 for appetite suppression.  It has stimulant effects and cannot be used with Ritalin, Concerta, or other stimulants.  Although it is not highly addictive, it's chemically related to amphetamines which are addictive.  Occasional dependence can develop, but rarely. The most common side effects are dry mouth, increased energy and concentration, increased pulse, and constipation.  You should not take phentermine if you have glaucoma, hyperthyroidism, or uncontrolled/untreated hypertension or overly anxious. You should stop if dramatic mood swings, severe insomnia, palpations,  chest pains, visual changes or if your Blood Pressure is consistently elevated or any time it's over 160/90.   It's ok to go off the med for a few weeks and restart if efficacy is wearing off.  $24-$30 for 90 tablets at [facility]. Females are required to have reliable birth control to reduce the risk birth defects/miscarriage.      TOPIRAMATE (Topamax): Anti-seizure medication, also used to prevent migraines and sometimes for mood stabilization.  Side effects include paresthesia, glaucoma, altered concentration, attention difficulties, memory and speech problems, metabolic acidosis, depression, increase in body temperature and decrease sweating, risk of kidney stones.  Do not take Topamax while taking Depakote as this can cause high ammonia levels.  You must have reliable birth control as Topamax can cause birth defects.  If prolonged use has occurred it should be tapered off slowly to avoid withdrawal issues.  Insurance usually covers Topiramate.  At higher doses, there may be some confusion/forgetfulness associated with this so we try to limit dose to under 75mg twice daily to reduce this risk. Often covered by insurance as it's used for many reasons.  Topamax will cause carbonated beverages to taste bad. A recheck of your kidney/electrolytes may occur within a few months of starting.    QSYMIA (Phentermine + Topamax):  See above information about phentermine and Topamax.  Most common side effects are paresthesia, dizziness, distortion of taste, insomnia, constipation, and dry mouth.  See above descriptions for the two individual agents.Females are required to have reliable birth control to reduce the risk birth defects/miscarriage.  $150-$220 per month      GLP1 Agonists:  Liraglutide (Victoza/Saxenda), Semaglutide (Ozempic/Wegovy):   Part of the family of Glucagon Like Peptide Agonists, these medications directly suppresses appetite and are often used by diabetic patients due to improvements in  "glucose/insulin balance.  They also slow how quickly the stomach empties so increase fullness. They may be hard to get covered for non diabetics and some plans have exclusions for weight loss purposes.  Currently, these are  injectable medications delivered via autoinjector pen. It can be very costly without insurance coverage (over $500/month).  Small risk for pancreatitis and dose should be held if increased mid abdominal pain/burning. It is not to be used if previous Multiple Endocrine Neoplasia. In rodents, may increase risk of thyroid tumors and not indicated for anyone with hx of medullary thyroid cancer as a result.  If changes in voice/swallowing should be discontinued. Reliable birth control required in women. [email or web address], [email or web address] has more information on these medications.    Contrave (Bupropion/Naltrexone).    Synergistic combination of a mild appetite suppressing anti-depressant (Bupropion) whose effects are increased due to interaction with Naltrexone.  Naltrexone may have some effects on craving and is often used in addiction medicine to help previous opiate addicts be less prone to relapse as it blocks the action of opiates. Should be stopped if any need for opiate pain medication, surgery or planned procedures where you'll be given sedation/anesthesia. If prolonged use recommend stepping down bupropion over 2-3 weeks to limit any risk of withdrawal issues. Side effects may include dry mouth, increased heart rate, mild elevation in Blood pressure;  dizziness, ringing in the ears, anxiety (typically due to bupropion), nausea, constipation, and some get fatigued with naltrexone.  About $210 on [facility] for 120 tabs of \"Contrave\", the brand name without insurance coverage. Generic Bupropion 75mg: $25 for 120 tabs, Naltrexone: $55 for 90 tabs without insurance coverage on [employer]. Cannot be used if pregnant/trying to conceive or breast feeding.      Plenity:   Recently available October 2020, by mail " "order pharmacy only, but expensive. $98/month.  2-3 capsules taken 20 minutes before 2 meals daily provides crystals that expand into a gel that provides a mechanical fullness. Gel mixes with your next meal and increases satisfaction by bulking the meal up to feel bigger than it truly is. Plenity is not absorbed and gets passed through the digestive tract and excreted in stools. May cause some bloating/gas/full feeling as it behaves like a fiber in many ways. Cost not available yet. FDA cleared in March of 2019 but not available in stores as of March of 2020. Clearance safety and efficacy data done under \"Gelesis\" name. Not appropriate for people with stomach or bowel motility issues as requires you to pass it through digestive tract. [email or web address] has more information.      On-the-Go Breakfast Ideas  As of 2015, the latest research shows what a huge impact eating breakfast has on losing weight and feeling your best. People lose more weight when they make breakfast their biggest meal of the day compared to Dinner, but even if you cannot go to that degree, getting a breakfast that has at least 20 grams of protein and even a moderate amount of fat is ideal for maintaining good energy through the day and limits overeating in the evening hours.  The following are some quick and easy suggestions for at least getting something of substance into your body in the morning.  Enjoy!    Eating breakfast within 90 minutes of waking up is an important part of taking care of your body.  After sleeping for hours, your body is in need of fuel.  An ideal breakfast is a combination of protein, whole-grain carbohydrates, or fruit.  Here s why:    -Protein digests very slowly in the body, helping you feel more satisfied.  -Whole grains provide dietary fiber, which also digests slowly and helps keep your gut clean.  -Fruit is a great source of vitamins, minerals, and fiber.      Each one of these breakfast combinations has between " 200-300 calories and 15-20 grams of protein.  Feel free to mix and match!    Protein: Choose  -1/2 cup low-fat cottage cheese  -2 hard boiled eggs , or one cooked in olive oil (low/slow heat).  -1 low fat string cheese stick  -1 Tablespoon natural peanut butter  -Availink vegetarian sausage clarisa (found in freezer section)  -1 slice lowfat cheese  -6 oz 2% or lowfat Greek yogurt, such as Fage or Oikos.    PLUS    Whole Grains:  Choose   -1 whole wheat English muffin  -1 whole wheat anuj, half  -1/2 Fiber One frozen muffin, thawed  -1/2 Fiber One toaster pastry  -1 whole wheat bagel thin  -1/2 cup Kashi cereal  -1 Kashi waffle (or other whole grain high-fiber waffle)    OR    Fruit: Choose  -1/3 cup blueberries  -1/2 banana (or a plantain- similar to a banana, yet smaller)  -1/2 cup cantaloupe cubes  -1 small apple  -1 small orange  -1/2 cup strawberries    *Adapted from Diabetes [employer], Fall 20    Ten Breakfasts Under 250 calories    Ideally, getting between 350-600 calories  (depending on starting height and weight)for breakfast is ideal for avoiding hunger later in the day, adjust/add to the following accordingly:    One- 250 calories, 8.5 g protein  1 slice whole-grain toast   1 Tbsp peanut butter    banana    Two- 250 calories, 8 g protein    cup nonfat/lowfat yogurt  1/3rd cup diced no-sugar peaches  1/3rd cup cereal (like Special K, Cheerios, or bran flakes)    Three- 250 calories, 25 g protein  1 egg scrambled with 1 oz skim milk    cup shredded cheddar    whole grain English muffin  1 oz Russian land  1 tsp margarine spread    Four- 225 calories, 25 g protein  1/2 cup Kashi Go-Lean cereal    cup skim milk mixed with 1 scoop Bariatric Advantage protein powder    cup no-sugar diced pears    Five- 250 calories, 20 g protein    cup oatmeal prepared with skim milk, 1 scoop protein powder, and sugar-free maple syrup    Six- 200 calories, 5 g protein  1 whole grain waffle, toasted  1 tablespoon creamy  peanut or almond butter    Seven-  250 calories, 19 g protein  Breakfast sandwich: 1 slice whole grain toast, cut in half.  Add 1 scrambled egg and one slice cheddar  cheese.    Eight-  250 calories, 15 g protein  2 eggs scrambled with 1/3 cup frozen spinach (heat before adding to eggs) and 2 tablespoons low fat cream cheese.    Nine-  150 calories, 15 g protein  2/3rd cup cottage cheese    cup cantaloupe    Ten- 200 calories, 20 g protein  Fruit smoothie made with 4 oz. nonfat Greek yogurt,   cup berries, 1 scoop protein powder, and 4 oz skim milk.    Ten Lunches Under 250 Calories    Aim for lunch to be around 300-400 calories a day when trying to lose weight and get that protein in!    One- 200 calories, 11 g protein  1/3 cup tuna salad made with light sanchez on 1 slice whole grain bread  1 small peeled apple    Two- 250 calories, 16 g protein  1/3 cup lowfat cottage cheese    cup cooked green beans    small fruit cocktail (in natural juice)    Three- 200 calories, 11 g protein    grilled cheese sandwich on whole grain bread with lowfat cheese  2/3rd cup of tomato soup    Four- 250 calories, 22 g protein  Deli wrap: 1 oz sliced turkey, 1 oz sliced ham, 1 oz sliced chicken rolled up with 1 slice low-fat cheese  1 small orange    Five- 250 calories, 28 g protein  2/3rd cup chili with 1 oz shredded cheese  4 saltine crackers    Six- 250 calories, 22 g protein  1 cup fresh spinach with 2 oz chicken, 1/3rd cup mandarin oranges, and 2 tablespoons sliced almonds with 1 tablespoon light vinaigrette dressing    Seven- 200 calories, 11 g protein  1 Tbsp sugar-free preserves and 1 Tbsp peanut butter on 1 slice whole grain toast    cup nonfat/lowfat Greek yogurt    Eight- 250 calories, 18 g protein  1 small soft-shell chicken taco with 1 oz shredded cheese, lettuce, tomato, salsa, and 1 Tbsp light sour cream    cup black beans    Nine- 225 calories, 13 g protein  2 ounces baked chicken  1/4 cup mashed potatoes    cup green  beans    Ten- 200 calories, 21 g protein  Deli anuj: 2 oz roast beef or other deli meat with 1 tsp Ari mayonnaise and sliced tomato, onion, and lettuce  1/3rd cup cottage cheese      Ten Dinners Under 300 calories    If you're eating a large breakfast and medium lunch, keep dinner small.  300-400 calories is ideal for most people depending on their caloric needs.    One- 300 calories, 12 g protein  1-inch thick slice of turkey meatloaf    cup baked butternut squash    Two- 200 calories, 9 g protein  Bread-less BLT: 3 slices turkey land, sliced tomato, wrapped in a large lettuce leaf    cup peeled fruit    Three- 275 calories, 36 g protein  3 oz roasted chicken    cup cooked broccoli    cup shredded cheddar cheese    cup unsweetened applesauce    Four- 200 calories, 25 g protein  3 oz baked tilapia  1/3rd cup cooked carrots    cup yogurt    Five- 250 calories, 20 g protein  Grilled ham  n  Swiss: spread 2 tsp light margarine on 1 slice of whole grain bread.  Cut bread in half, layer 2 oz deli ham with 1 piece of Swiss cheese and grill until cheese is melted.    cup cooked vegetables    Six- 250 calories, 18 g protein  Vegetarian cheeseburger: 1 Boca cheeseburger topped with lettuce, onion, tomato, and ketchup/mustard    cup sweet potato fries    Seven- 250 calories, 18 g protein  Pork pot roast: 2 oz roasted pork loin, 1/3rd cup roasted carrots,   medium potato, cooked with   cup gravy    Eight- 300 calories, 25 g protein  2 oz meatballs (about 2 small meatballs)    cup spaghetti sauce  1/2 piece toast topped with 1 tsp light margarine and topped with garlic powder, toasted in oven    Nine- 250 calories, 16 g protein  Mexican pizza: one 8  corn tortilla topped with 2 oz chicken,   cup salsa, 2 tablespoons black beans, 2 tablespoons shredded cheese.  Bake until cheese is melted.    Ten- 250 calories, 22 g protein  Shrimp stir-castillo: 3 oz cooked shrimp, 1/6th onion,   pepper,   cup chopped carrots sautéed in 1  tablespoon olive oil, topped with 2 tablespoons stir castillo sauce and a pinch of sesame seeds  Protein Supplements    Look for (per serving):  15-30 grams protein  Less than 10 grams total carbohydrate    Product Type Serving Calories Protein Grams Sugar Grams Where Available   Premier Protein Shake 1 can 160 30 1 [facility]   AdvantEdge Carb (EAS) Shake 1 can 110 17 0 [facility], [employer], Grocery/Pharmacy   Pure Protein Shake 1 can 110 20 1 Target,  Joes   Slim Fast   High Protein Shake 1 can 190 20 1 [facility], [facility], Grocery/Pharmacy   Atkins Advantage Shake 1 can 160 15-17 1 [facility], [facility], Grocery/Pharmacy   Isopure Zero Carb Juice   bottle 80 20 0 [employer], vitamin shoppe, online   Muscle Milk Light Shake 1 can 110 15 0 [facility], [facility], [facility], Grocery/Pharmacy    Whey Shake 1 bottle 100 18 2 www.designerwhey.com   Unjury Protein Powder 1 scoop 90 20 0 www.unjury.MeSixty  www[employer] International  9-935-674-7739   Paulo Nutrition (gluten-free) Powder 1 scoop 180 27 2 www.[email or web address] Surgical [email or web address]   [place] Protein Powder  (fruit-flavors) 1 scoop or packet 90 23 0 www.dietdirect.MeSixty  www.bariatriceating.com  [employer], Vitamin Shoppe    Whey Powder 1 scoop 100 18 2 Target, [employer], [facility], wwwTrialPay   Myoplex Lite Powder 1 scoop 180 25 1 www.eas.com  [employer], [employer]  [facility]   Zay Maximiliano Whey Protein Powder Powder 1 scoop 110 25 0 [employer], Vitamin Shoppe, etc     Protein powders can be used and many brands exist that can provide the 20-30 grams of protein per serving: GNC, Gibson's Whey, BiPro USA, BulletProof Collagen and many more are examples in many stores. Cost can be a concern.      Exercise Guidance    Nearly everything that bothers us gets better when the proper amount of exercise can be done in the proper amounts.  Getting to that level safely and without injury is the key.  When it comes to weight loss, exercise is especially important in maintaining the weight loss.  Unfortunately, one of the harsh  realities is that substantial weight loss slows our metabolism, often anywhere from 5-20%.    Our brain always remembers our heaviest weight and we can return to that if we're not mindful and moving regularly.  Our biology doesn't understand the concept of having too much energy, only not having enough.  As such, when we lose weight, it's thought that the brain interprets this as we're ill or in a famine and dials back our metabolism to limit further weight loss.  This is why exercise is so important in keeping the weight off and is the main reason people have some weight regain from their low weight point after weight loss.  We have to make up that 10-20% of calories not being burned.Since we can restrict our intake for only so long, exercise becomes very important in our long term healthy weigh maintenance to balance out the occasional indiscretion with our diet.    Generally, for every 5% body weight reduction in a weight loss season, a person needs to add  kilocalories of exercise in their daily routine to keep that weight off for the long term.  This is why it's vital to be starting your fitness regimen during weight loss season, so that routine is well established as you move into your maintenance period.    Additionally, all sorts of good enzymes and genes turn on with exercise and our stress, sleep, mood and bodies feel better when we can get to the point of making ourselves a little sweaty and short of breath 35-50 minutes most days of the week. But we have to start with what we can do first and give ourselves permission to work our way up to this goal.    Who isn't ready for exercise? Well, if you get severe dizziness/palpitations, chest pain or short of breath/faint with even minimal activity like walking across a room or you're having to pause while going up a flight of stairs, then getting your heart and/or lungs fully evaluated prior to starting an exercise regimen is recommended. Everyone else  "can probably start a program, but everyone may start at a different point:  Some can set a 5-10 minute walking goal and others will be able to ride their bike for an hour.      Start with where you're at and look to add 10% more each week until you're at that 150 minutes or more a week (or 75 minutes/week or more of vigorous exercise). Moderate exercise can be estimated as the pace you can carry on a conversation and vigorous is the pace at which you can get 3-5 words out before having to take a breath.  If you're using heart rate monitoring, Moderate is about 60% of your maximum heart rate and vigorous about 75%. (Max heart rate estimated as 220 beats minus your age:  Example: 220-age of 42 =176 Beats per minute (BPM) maximum. 0.6X 176= 105 BPM (moderate), 132 BMP(vigorous)).    If you like to count steps, the 10,000 steps per day does correlate well with weight maintenance but try to make at least 20-25% of those steps at a brisk pace (like you are about to miss your bus).    Finally, if you are pressed for time, it's important to know that some exercise is better than none.  High Intensity Interval training (HIIT) is a good way to get as much out of a short period of working out. If you can't walk, use the stairs, bike or swim; you could use a punching/arm workout regimen for your activity.  The idea with HIIT is to have a 3-6 minute warm up period of low intensity and the 3-6 \"intervals\" where you push the intensity up and then recover and start the next interval. One study showed that 3 intervals of 20 seconds at \"Maximum Effort\" while either biking on a stationary bike or going up stairs and then having 100 seconds recovery time before the next Maximum Effort was equally as beneficial on cardiovascular fitness development as doing 30 minutes of moderately paced walking 3 days weekly over a 6 week period of time.  So intensity matters. You just need to be able to safely do your desired exercise without injury. " There are many great HIIT exercises/routines out there. IF you're not doing much exercise currently, I recommend giving your self 2-3 weeks of moderate exercise, 3 days weekly minimum to get your bones/tendons/muscles used to exercise before going for High Intensity workouts.    If you like to use Apps on the phone, the couch to 5k jonathan and 7 minute workout apps are nice places to start if you are reasonably healthy.  There are hundreds of other options out there.  Consider viewing BetterLessonube if gentler exercise/movement is desired. Videos on Emil Chi and chair yoga for seniors exist and are free. Check them out and let's get that 3-4 days a week routine going.    Let's move!  Itz Singh MD.         l

## 2022-04-08 NOTE — PROGRESS NOTES
"65 minutes spent on the date of the encounter doing chart review, history and exam, documentation and further activities per the note    New Bariatric Surgery Consultation Note    2022    RE: Milton Rivera  MR#: [id number]  : 1980      Referring provider:       2022   Who referred you? Dr. Mo       Chief Complaint/Reason for visit: evaluation for possible weight loss surgery      I had the pleasure of seeing your patient, Milton Rivera, to evaluate his obesity and consider him for possible weight loss surgery. As you know, Milton Rivera is 41 year old.  He has a height of 5' 10\", a weight of 340 lbs 6.4 oz, and calculated Body mass index is 48.84 kg/m ..  Today we discussed our Bariatric Surgery program to address their weight related health. he has not yet viewed our online seminar prior to this visit and on discussion today, has decided to defer the surgical program at this time. We discussed the outline of the program today, the basic needs of a sleeve gastrectomy procedure and after care/follow up. We also explored non surgical options for weight management, including medication support, and he's hesitant at this point to embrace appetite suppressant medication and wishes to focus on mindful dietary therapy and exercise therapy. His exercise is limited by chronic low back pain and recommendations were given today for possible additional therapy which he plans to contemplate. Overall, today's visit was mostly a fact finding mission on his part and we'll plan to reassess the different options available to him as he progresses in our program and adjust his weight management tools based on his preferences and alter based on results. Certainly, with his morbid obesity with co-morbid DELMY, hyperlipidemia, hypertension, hepatic steatosis and abdominal hernia issues, bariatric surgery would off the most reliable/durable weight reduction given his BMI of 48.8 today.    He was " referred to our program by Dr. Mo due to abdominal wall hernia issues that would not be durably repaired at his current BMI and given his comorbid hypertension, GERD, DELMY, arthritis and hyperlipidemia, Bariatric Surgery  would provide the most reliable and durable way to improve his weight related health conditions.        Assessment & Plan   Problem List Items Addressed This Visit        Digestive    Morbid obesity (H)         Plan:  1. Welcome to your weight loss season. To start you felt a non surgical approach was what you desired but I'd encourage you to view the seminar below to round out your knowledge of the surgical option which would likely provide the most reliable/durable way to a 25-30% total body weight reduction.    2. IN the meanwhile, we'll focus on dietary/mindfulness and exercise tools. You've declined medication at this point in time but feel free to reach out if changing your mind before our follow up.  Low dose phentermine or Wegovy/Saxenda would be our 2 top options (see below for all meds but Contrave would likely interact with your metoprolol and slow heart rate too much to feel well).     3. Start by focusing on getting your first meal of the day within 2-3 hours of waking, and each meal coming every 4.5-6 hours and containing 30grams of lean protein per meal with one mid afternoon snack of veggie/fruit with some low protein.     4. Follow up with dietician.    5. Check labs today (will get later as had to go).     6. Recheck with me in 4-6 weeks.      HISTORY OF PRESENT ILLNESS:  Weight Loss History Reviewed with Patient 4/5/2022   How long have you been overweight? Since late 20's to early 40's   What is the most that you have ever weighed? 336   What is the most weight you have lost? 72   I have tried the following methods to lose weight Watching portions or calories, Exercise   I have tried the following weight loss medications? (Check all that apply) None   Have you ever had weight  loss surgery? No       CO-MORBIDITIES OF OBESITY INCLUDE:     4/5/2022   I have the following health issues associated with obesity: Pre-Diabetes, High Blood Pressure, Sleep Apnea, Fatty Liver   abdominal hernia     PAST MEDICAL HISTORY:  Past Medical History:   Diagnosis Date     Arthritis     Knees     COVID 01/2022     Gastro-oesophageal reflux disease      Hyperlipidemia      Hypertension      DELMY (obstructive sleep apnea)     Uses CPAP     PONV (postoperative nausea and vomiting)      Sleep apnea     Uses CPAP       PAST SURGICAL HISTORY:  Past Surgical History:   Procedure Laterality Date     ARTHROSCOPY KNEE RT/LT Bilateral      ARTHROSCOPY SHOULDER RT/LT Bilateral      BUNIONECTOMY Left 11/21/2014    Procedure: BUNIONECTOMY;  Surgeon: Rome Munoz DPM;  Location: [facility] OR     BUNIONECTOMY RT/LT Right        FAMILY HISTORY:   Family History   Problem Relation Age of Onset     Diabetes Father      Coronary Artery Disease Father         triple bypass     Myocardial Infarction Father      C.A.D. Maternal Grandfather         MI at age 72       SOCIAL HISTORY:   Social History Questions Reviewed With Patient 4/5/2022   Which best describes your employment status (select all that apply) I work full-time   If you work, what is your occupation?    Which best describes your marital status:    Do you have children? Yes   Who do you have in your support network that can be available to help you for the first 2 weeks after surgery? My wife and family   Who can you count on for support throughout your weight loss surgery journey? My wife and family   Can you afford 3 meals a day?  Yes   Can you afford 50-60 dollars a month for vitamins? Yes   16 yo daughter.    HABITS:     4/5/2022   How often do you drink alcohol? 2-4 times a month   If you do drink alcohol, how many drinks might you have in a day? (one drink = 5 oz. wine, 1 can/bottle of beer, 1 shot liquor) 7 to 9   Have you ever used any of  the following nicotine products? No   Have you or are you currently using street drugs or prescription strength medication for which you do not have a prescription for? No   Do you have a history of chemical dependency (alcohol or drug abuse)? No         PSYCHOLOGICAL HISTORY:   Psychological History Reviewed With Patient 4/5/2022   Have you ever attempted suicide? Never.   Have you had thoughts of suicide in the past year? No   Have you ever been hospitalized for mental illness or a suicide attempt? Never.   Do you have a history of chronic pain? No   Have you ever been diagnosed with fibromyalgia? No   Are you currently seeing a therapist or counselor?  No   Are you currently seeing a psychiatrist? No       ROS:     4/5/2022   Skin:  Leg swelling   HEENT: None of these   Musculoskeletal: Joint Pain, Back pain, Swelling of legs, Arthritis   Cardiovascular: Shortness of breath with activity   Pulmonary: Shortness of breath with activity, Snoring, Awaken from sleep to catch your breath, People have told me I stop breathing while asleep   Gastrointestinal: None of the above   Genitourinary: None of the above   Hematological: None of the above   Neurological: None of the above       EATING BEHAVIORS:     4/5/2022   Do you currently binge eat (eat a large amount of food in a short time)? No   Are you an emotional eater? No   Do you get up to eat after falling asleep? No       EXERCISE:     4/5/2022   How often do you exercise? Never   What keeps you from being more active?  Pain, I should be more active but I just have not gotten around to it   low back pain limits him.   Hobbies: golf (back limited). carted    MEDICATIONS:  Current Outpatient Medications   Medication Sig Dispense Refill     amLODIPine (NORVASC) 10 MG tablet Take 10 mg by mouth daily       chlorthalidone (HYGROTON) 25 MG tablet Take 1 tablet (25 mg) by mouth daily 90 tablet 1     losartan (COZAAR) 100 MG tablet Take 100 mg by mouth daily        "Metoprolol Succinate 200 MG CS24 Take 200 mg by mouth daily       potassium chloride ER (KLOR-CON M) 20 MEQ CR tablet Take 2 tablets (40 mEq) by mouth 2 times daily 120 tablet 3       ALLERGIES:  No Known Allergies    10/1/21 labs w/ glucose 141, BMP o/w normal. ECHO 11/2/21 at EF of 65% and no WMA.     PHYSICAL EXAM:  Objective    /78 (BP Location: Right arm, Patient Position: Sitting)   Ht 1.778 m (5' 10\")   Wt (!) 154.4 kg (340 lb 6.4 oz)   BMI 48.84 kg/m    /78 (BP Location: Right arm, Patient Position: Sitting)   Ht 1.778 m (5' 10\")   Wt (!) 154.4 kg (340 lb 6.4 oz)   BMI 48.84 kg/m    Body mass index is 48.84 kg/m .  Physical Exam   GENERAL: healthy, alert and no distress. Bearded. Thick neck.   NECK: no adenopathy, no asymmetry, masses, or scars and thyroid normal to palpation  RESP: lungs clear to auscultation - no rales, rhonchi or wheezes  CV: regular rate and rhythm, normal S1 S2, no S3 or S4, no murmur, click or rub, no peripheral edema and peripheral pulses strong  ABDOMEN: central adiposity with  soft, nontender,  MS: no gross musculoskeletal defects noted, no edema today to palpation of pretibial skin.     Sincerely,     Itz Singh MD        "

## 2022-04-11 ENCOUNTER — TELEPHONE (OUTPATIENT)
Dept: SURGERY | Facility: CLINIC | Age: 42
End: 2022-04-11
Payer: COMMERCIAL

## 2022-04-11 NOTE — TELEPHONE ENCOUNTER
Pt had initial appt with  on Friday 4/8, had to leave quickly after so requested I call him on Monday 4/11 after 3:00 to get appts set up.   No answer x2.   Provided call back number for pt.    Needs following appointments  - lab draw (anytime before follow up with )  - new/inital dietician visit at earliest convince   - return/followup visit with  in about 6 weeks

## 2022-07-05 ENCOUNTER — LAB REQUISITION (OUTPATIENT)
Dept: LAB | Facility: CLINIC | Age: 42
End: 2022-07-05

## 2022-07-05 DIAGNOSIS — I10 ESSENTIAL (PRIMARY) HYPERTENSION: ICD-10-CM

## 2022-07-05 DIAGNOSIS — E78.2 MIXED HYPERLIPIDEMIA: ICD-10-CM

## 2022-07-05 PROCEDURE — 80053 COMPREHEN METABOLIC PANEL: CPT | Performed by: PHYSICIAN ASSISTANT

## 2022-07-05 PROCEDURE — 80061 LIPID PANEL: CPT | Performed by: PHYSICIAN ASSISTANT

## 2022-07-06 LAB
ALBUMIN SERPL BCG-MCNC: 4.2 G/DL (ref 3.5–5.2)
ALP SERPL-CCNC: 48 U/L (ref 40–129)
ALT SERPL W P-5'-P-CCNC: 36 U/L (ref 10–50)
ANION GAP SERPL CALCULATED.3IONS-SCNC: 13 MMOL/L (ref 7–15)
AST SERPL W P-5'-P-CCNC: 28 U/L (ref 10–50)
BILIRUB SERPL-MCNC: 0.4 MG/DL
BUN SERPL-MCNC: 14.7 MG/DL (ref 6–20)
CALCIUM SERPL-MCNC: 9.6 MG/DL (ref 8.6–10)
CHLORIDE SERPL-SCNC: 103 MMOL/L (ref 98–107)
CHOLEST SERPL-MCNC: 219 MG/DL
CREAT SERPL-MCNC: 0.97 MG/DL (ref 0.67–1.17)
DEPRECATED HCO3 PLAS-SCNC: 25 MMOL/L (ref 22–29)
GFR SERPL CREATININE-BSD FRML MDRD: >90 ML/MIN/1.73M2
GLUCOSE SERPL-MCNC: 134 MG/DL (ref 70–99)
HDLC SERPL-MCNC: 42 MG/DL
LDLC SERPL CALC-MCNC: 137 MG/DL
NONHDLC SERPL-MCNC: 177 MG/DL
POTASSIUM SERPL-SCNC: 3.3 MMOL/L (ref 3.4–5.3)
PROT SERPL-MCNC: 7.2 G/DL (ref 6.4–8.3)
SODIUM SERPL-SCNC: 141 MMOL/L (ref 136–145)
TRIGL SERPL-MCNC: 201 MG/DL

## 2022-07-27 ENCOUNTER — OFFICE VISIT (OUTPATIENT)
Dept: CARDIOLOGY | Facility: CLINIC | Age: 42
End: 2022-07-27
Payer: COMMERCIAL

## 2022-07-27 VITALS
OXYGEN SATURATION: 96 % | WEIGHT: 315 LBS | RESPIRATION RATE: 18 BRPM | DIASTOLIC BLOOD PRESSURE: 66 MMHG | SYSTOLIC BLOOD PRESSURE: 112 MMHG | HEART RATE: 70 BPM | BODY MASS INDEX: 48.35 KG/M2

## 2022-07-27 DIAGNOSIS — R07.2 PRECORDIAL PAIN: Primary | ICD-10-CM

## 2022-07-27 DIAGNOSIS — I10 BENIGN ESSENTIAL HYPERTENSION: ICD-10-CM

## 2022-07-27 DIAGNOSIS — I51.7 LVH (LEFT VENTRICULAR HYPERTROPHY): ICD-10-CM

## 2022-07-27 PROCEDURE — 99214 OFFICE O/P EST MOD 30 MIN: CPT | Performed by: INTERNAL MEDICINE

## 2022-07-27 NOTE — PATIENT INSTRUCTIONS
Milton Rivera,    It was a pleasure to see you today at the [facility].     My recommendations after this visit include:    Stress MR RICARDO. Ilya Stevenson MD, FACC, KATEY

## 2022-07-27 NOTE — LETTER
7/27/2022    Timo Pack MD  [facility] [address] [address]    RE: Milton Friasjeanne       Dear Colleague,     I had the pleasure of seeing Milton ISRAEL Rivera in the [facility].      Cardiology Progress Note     Assessment:  Hypertension, resistant, good control  Left ventricle hypertrophy, mildly asymmetrical without LVOT obstruction likely related to hypertensive heart and possibly  body habitus  Chest pain worsened when blood pressure was out of control  Obesity      Plan:  Continue current antihypertensive medications  Stress MR to reassess coronary circulation and severity of left ventricular hypertrophy    Follow-up based on the results of the test    Subjective:   This is 41 year old male who comes in today for follow-up visit.  I saw him here last year because of chest pain and shortness of breath.  He had echo which showed  asymmetrical LV hypertrophy.  He was supposed to undergo stress tomorrow but was canceled due to COVID-19.  He continues to experience occasional chest discomfort.  The chest discomfort became much more severe when he was out of his antihypertensive medication.  Systolic blood pressure went up to over 180 and diastolic was 120 when he was without medications.  He is back on his antihypertensive medication.  He is feeling better.  He denies heart palpitations or syncope.    Review of Systems:   Negative other than history of present illness    Objective:   /66 (BP Location: Left arm, Patient Position: Sitting, Cuff Size: Adult Large)   Pulse 70   Resp 18   Wt (!) 152.9 kg (337 lb)   SpO2 96%   BMI 48.35 kg/m    Physical Exam:  GENERAL: no distress  NECK: No JVD  LUNGS: Clear to auscultation.  CARDIAC: regular rhythm, S1 & S2 normal.  No heaves, thrills, gallops or murmurs.  ABDOMEN: flat, negative hepatosplenomegaly, soft and non-tender.  EXTREMITIES: No evidence of cyanosis, clubbing or edema.    Current  Outpatient Medications   Medication Sig Dispense Refill     amLODIPine (NORVASC) 10 MG tablet Take 10 mg by mouth daily       chlorthalidone (HYGROTON) 25 MG tablet Take 1 tablet (25 mg) by mouth daily 90 tablet 1     losartan (COZAAR) 100 MG tablet Take 100 mg by mouth daily       Metoprolol Succinate 200 MG CS24 Take 200 mg by mouth daily       potassium chloride ER (KLOR-CON M) 20 MEQ CR tablet Take 2 tablets (40 mEq) by mouth 2 times daily 120 tablet 3       Cardiographics:    ECG: Personally reviewed.  8/26/2021 normal sinus rhythm no significant LVH    Echo: November 2021  Moderate assymetric septal hypertrophy.  Left ventricular function is normal.The ejection fraction is 60-65%.  No regional wall motion abnormalities noted.  No hemodynamically significant valvular abnormalities on 2D or color flow  imaging.      Nuclear perfusion test 2018 at [facility]  Normal    CT coronary angiogram in 2018 at [facility]  Normal     Coronary Angiogram: 2007 reportedly normal     Lab Results    Chemistry/lipid CBC Cardiac Enzymes/BNP/TSH/INR   Recent Labs   Lab Test 07/05/22  1511   CHOL 219*   HDL 42   *   TRIG 201*     Recent Labs   Lab Test 07/05/22  1511 01/09/20  1546 08/03/18  1553   * 148* 166*     Recent Labs   Lab Test 07/05/22  1511      POTASSIUM 3.3*   CHLORIDE 103   CO2 25   *   BUN 14.7   CR 0.97   GFRESTIMATED >90   ARIANNE 9.6     Recent Labs   Lab Test 07/05/22  1511 10/01/21  1605 09/22/21  1554   CR 0.97 0.96 1.03     No results for input(s): A1C in the last 33713 hours.       Recent Labs   Lab Test 08/27/21  1527   WBC 6.6   HGB 14.9   HCT 42.7   MCV 88        Recent Labs   Lab Test 08/27/21  1527 09/03/18  1234   HGB 14.9 14.2    Recent Labs   Lab Test 09/03/18  1234   TROPONINI <0.01     No results for input(s): BNP, NTBNPI, NTBNP in the last 53769 hours.  Recent Labs   Lab Test 08/26/21  1642   TSH 1.08     Recent Labs   Lab Test 09/03/18  1234   INR 1.09                         Thank you for allowing me to participate in the care of your patient.      Sincerely,     Ilya Stevenson MD     [facility]

## 2022-07-27 NOTE — PROGRESS NOTES
Cardiology Progress Note     Assessment:  Hypertension, resistant, good control  Left ventricle hypertrophy, mildly asymmetrical without LVOT obstruction likely related to hypertensive heart and possibly  body habitus  Chest pain worsened when blood pressure was out of control  Obesity      Plan:  Continue current antihypertensive medications  Stress MR to reassess coronary circulation and severity of left ventricular hypertrophy    Follow-up based on the results of the test    Subjective:   This is 41 year old male who comes in today for follow-up visit.  I saw him here last year because of chest pain and shortness of breath.  He had echo which showed  asymmetrical LV hypertrophy.  He was supposed to undergo stress tomorrow but was canceled due to COVID-19.  He continues to experience occasional chest discomfort.  The chest discomfort became much more severe when he was out of his antihypertensive medication.  Systolic blood pressure went up to over 180 and diastolic was 120 when he was without medications.  He is back on his antihypertensive medication.  He is feeling better.  He denies heart palpitations or syncope.    Review of Systems:   Negative other than history of present illness    Objective:   /66 (BP Location: Left arm, Patient Position: Sitting, Cuff Size: Adult Large)   Pulse 70   Resp 18   Wt (!) 152.9 kg (337 lb)   SpO2 96%   BMI 48.35 kg/m    Physical Exam:  GENERAL: no distress  NECK: No JVD  LUNGS: Clear to auscultation.  CARDIAC: regular rhythm, S1 & S2 normal.  No heaves, thrills, gallops or murmurs.  ABDOMEN: flat, negative hepatosplenomegaly, soft and non-tender.  EXTREMITIES: No evidence of cyanosis, clubbing or edema.    Current Outpatient Medications   Medication Sig Dispense Refill     amLODIPine (NORVASC) 10 MG tablet Take 10 mg by mouth daily       chlorthalidone (HYGROTON) 25 MG tablet Take 1 tablet (25 mg) by mouth daily 90 tablet 1     losartan (COZAAR) 100 MG tablet  Take 100 mg by mouth daily       Metoprolol Succinate 200 MG CS24 Take 200 mg by mouth daily       potassium chloride ER (KLOR-CON M) 20 MEQ CR tablet Take 2 tablets (40 mEq) by mouth 2 times daily 120 tablet 3       Cardiographics:    ECG: Personally reviewed.  8/26/2021 normal sinus rhythm no significant LVH    Echo: November 2021  Moderate assymetric septal hypertrophy.  Left ventricular function is normal.The ejection fraction is 60-65%.  No regional wall motion abnormalities noted.  No hemodynamically significant valvular abnormalities on 2D or color flow  imaging.      Nuclear perfusion test 2018 at [facility]  Normal    CT coronary angiogram in 2018 at [facility]  Normal     Coronary Angiogram: 2007 reportedly normal     Lab Results    Chemistry/lipid CBC Cardiac Enzymes/BNP/TSH/INR   Recent Labs   Lab Test 07/05/22  1511   CHOL 219*   HDL 42   *   TRIG 201*     Recent Labs   Lab Test 07/05/22  1511 01/09/20  1546 08/03/18  1553   * 148* 166*     Recent Labs   Lab Test 07/05/22  1511      POTASSIUM 3.3*   CHLORIDE 103   CO2 25   *   BUN 14.7   CR 0.97   GFRESTIMATED >90   ARIANNE 9.6     Recent Labs   Lab Test 07/05/22  1511 10/01/21  1605 09/22/21  1554   CR 0.97 0.96 1.03     No results for input(s): A1C in the last 18669 hours.       Recent Labs   Lab Test 08/27/21  1527   WBC 6.6   HGB 14.9   HCT 42.7   MCV 88        Recent Labs   Lab Test 08/27/21  1527 09/03/18  1234   HGB 14.9 14.2    Recent Labs   Lab Test 09/03/18  1234   TROPONINI <0.01     No results for input(s): BNP, NTBNPI, NTBNP in the last 32188 hours.  Recent Labs   Lab Test 08/26/21  1642   TSH 1.08     Recent Labs   Lab Test 09/03/18  1234   INR 1.09

## 2022-08-22 ENCOUNTER — TELEPHONE (OUTPATIENT)
Dept: CARDIOLOGY | Facility: CLINIC | Age: 42
End: 2022-08-22

## 2022-08-22 DIAGNOSIS — I11.9 HYPERTENSIVE HEART DISEASE WITHOUT HEART FAILURE: ICD-10-CM

## 2022-08-22 DIAGNOSIS — I51.7 LVH (LEFT VENTRICULAR HYPERTROPHY): Primary | ICD-10-CM

## 2022-08-22 DIAGNOSIS — R07.9 CHEST PAIN, UNSPECIFIED TYPE: ICD-10-CM

## 2022-08-22 NOTE — TELEPHONE ENCOUNTER
[facility] Call Center    Phone Message    May a detailed message be left on voicemail: yes     Reason for Call: Order(s): Other:   Reason for requested: MRI Order updated  Date needed: 8/24/22  Provider name: Dr Stevenson    Please have in clinic staff call the pt to schedule MRI w/Stress       Action Taken: Other: Cardiology    Travel Screening: Not Applicable

## 2022-08-23 NOTE — TELEPHONE ENCOUNTER
Message  Received: Today  Hafsa Zhang Mallory J, RN  Caller: Unspecified (Yesterday,  1:54 PM)  Hi Mal,     I don't see the order. Are you able to put a new one in?           ==previous order placed by [facility] replaced. msg to update him. [place] [place]

## 2022-08-23 NOTE — TELEPHONE ENCOUNTER
Authorizing Provider Encounter Provider   Ilya Stevenson MD Zukowski, Ted, MD       ABN Associated with this Order    There is no ABN associated with this order.                    Ordering Provider's NPI: [id number]  Ilya Stevenson        MRI CARDIAC W STRESS IMAGING WO&W CONTRAST [[id number]]    Electronically signed by: Ilya Stevenson MD on 07/27/22 1512 Status: Active   Ordering user: Ilya Stevenson MD 07/27/22 1512         Order History  Outpatient  Date/Time Action Taken User Additional Information   07/27/22 1512 Sign Ilya Stevenson MD          Future Order Information    Expected Expires      7/27/2022 (Approximate) 9/10/2022                Md ordered 7/27; msg to aron to please arrange; order extended. -[facility] [place]

## 2022-08-23 NOTE — TELEPHONE ENCOUNTER
----- Message from Hafsa Zhang sent at 8/23/2022  1:21 PM CDT -----  Regarding: FW: [facility] - STRESS MRI  Hi Mal,    This pt is calling back again to schedule the Stress MRI. Should the pt still have the MRI? If so, are you able to put in a new order?  ----- Message -----  From: Hafsa Zhang  Sent: 8/16/2022   9:31 AM CDT  To: Rubina Rivas RN  Subject: [facility] - STRESS MRI                            Hi Mal,    I just spoke with this pt who would like to schedule the Stress MRI that Dr. Stevenson ordered in November. The stress orders are only good for 90 days. Should the pt still have the Stress MRI? If so, are you able to put in a new order for that? Thanks!

## 2022-09-06 ENCOUNTER — APPOINTMENT (OUTPATIENT)
Dept: URBAN - METROPOLITAN AREA CLINIC 260 | Age: 42
Setting detail: DERMATOLOGY
End: 2022-09-06

## 2022-09-06 VITALS — WEIGHT: 315 LBS | HEIGHT: 67 IN

## 2022-09-06 DIAGNOSIS — L57.8 OTHER SKIN CHANGES DUE TO CHRONIC EXPOSURE TO NONIONIZING RADIATION: ICD-10-CM

## 2022-09-06 DIAGNOSIS — L30.4 ERYTHEMA INTERTRIGO: ICD-10-CM

## 2022-09-06 DIAGNOSIS — D18.0 HEMANGIOMA: ICD-10-CM

## 2022-09-06 DIAGNOSIS — L81.4 OTHER MELANIN HYPERPIGMENTATION: ICD-10-CM

## 2022-09-06 DIAGNOSIS — Z71.89 OTHER SPECIFIED COUNSELING: ICD-10-CM

## 2022-09-06 DIAGNOSIS — D22 MELANOCYTIC NEVI: ICD-10-CM

## 2022-09-06 DIAGNOSIS — L91.8 OTHER HYPERTROPHIC DISORDERS OF THE SKIN: ICD-10-CM

## 2022-09-06 DIAGNOSIS — L82.1 OTHER SEBORRHEIC KERATOSIS: ICD-10-CM

## 2022-09-06 PROBLEM — D18.01 HEMANGIOMA OF SKIN AND SUBCUTANEOUS TISSUE: Status: ACTIVE | Noted: 2022-09-06

## 2022-09-06 PROBLEM — D22.5 MELANOCYTIC NEVI OF TRUNK: Status: ACTIVE | Noted: 2022-09-06

## 2022-09-06 PROCEDURE — OTHER SKIN TAG REMOVAL MULTI: OTHER

## 2022-09-06 PROCEDURE — 11200 RMVL SKIN TAGS UP TO&INC 15: CPT

## 2022-09-06 PROCEDURE — 99213 OFFICE O/P EST LOW 20 MIN: CPT | Mod: 25

## 2022-09-06 PROCEDURE — OTHER MIPS QUALITY: OTHER

## 2022-09-06 PROCEDURE — OTHER COUNSELING: OTHER

## 2022-09-06 ASSESSMENT — LOCATION SIMPLE DESCRIPTION DERM
LOCATION SIMPLE: RIGHT THIGH
LOCATION SIMPLE: LEFT UPPER BACK
LOCATION SIMPLE: LEFT ANTERIOR NECK
LOCATION SIMPLE: RIGHT UPPER BACK
LOCATION SIMPLE: LEFT ANTERIOR AXILLA
LOCATION SIMPLE: RIGHT AXILLARY VAULT
LOCATION SIMPLE: POSTERIOR NECK
LOCATION SIMPLE: UPPER BACK
LOCATION SIMPLE: LEFT AXILLARY VAULT

## 2022-09-06 ASSESSMENT — LOCATION ZONE DERM
LOCATION ZONE: NECK
LOCATION ZONE: LEG
LOCATION ZONE: TRUNK
LOCATION ZONE: AXILLAE

## 2022-09-06 ASSESSMENT — LOCATION DETAILED DESCRIPTION DERM
LOCATION DETAILED: INFERIOR THORACIC SPINE
LOCATION DETAILED: RIGHT INFERIOR POSTERIOR NECK
LOCATION DETAILED: LEFT INFERIOR MEDIAL UPPER BACK
LOCATION DETAILED: LEFT CLAVICULAR NECK
LOCATION DETAILED: LEFT MEDIAL UPPER BACK
LOCATION DETAILED: LEFT AXILLARY VAULT
LOCATION DETAILED: RIGHT ANTERIOR PROXIMAL THIGH
LOCATION DETAILED: RIGHT LATERAL TRAPEZIAL NECK
LOCATION DETAILED: RIGHT AXILLARY VAULT
LOCATION DETAILED: LEFT ANTERIOR AXILLA
LOCATION DETAILED: LEFT INFERIOR POSTERIOR NECK
LOCATION DETAILED: RIGHT SUPERIOR MEDIAL UPPER BACK

## 2022-09-06 NOTE — PROCEDURE: COUNSELING
Detail Level: Zone
Detail Level: Generalized
Detail Level: Simple
Detail Level: Detailed
Patient Specific Counseling (Will Not Stick From Patient To Patient): He will use Vaseline or hydrocortisone for this. Deferred prescription

## 2022-09-06 NOTE — PROCEDURE: SKIN TAG REMOVAL MULTI
Consent: Written consent obtained and the risks of skin tag removal was reviewed with the patient including but not limited to bleeding, pigmentary change, infection, pain, and remote possibility of scarring.
Anesthesia Type: 2% lidocaine with epinephrine
Include Z78.9 (Other Specified Conditions Influencing Health Status) As An Associated Diagnosis?: No
Total Number Of Lesions Treated: 14
Add Associated Diagnoses If Applicable When Selecting Medical Necessity: Yes
Medical Necessity Clause: This procedure was medically necessary because the lesions that were treated were:
Medical Necessity Information: It is in your best interest to select a reason for this procedure from the list below. All of these items fulfill various CMS LCD requirements except the new and changing color options.
Detail Level: Detailed
Anesthesia Volume In Cc: 3

## 2022-09-20 ENCOUNTER — HOSPITAL ENCOUNTER (OUTPATIENT)
Dept: MRI IMAGING | Facility: HOSPITAL | Age: 42
Discharge: HOME | End: 2022-09-20
Attending: INTERNAL MEDICINE
Payer: COMMERCIAL

## 2022-09-20 VITALS
HEART RATE: 81 BPM | RESPIRATION RATE: 20 BRPM | SYSTOLIC BLOOD PRESSURE: 126 MMHG | DIASTOLIC BLOOD PRESSURE: 79 MMHG | OXYGEN SATURATION: 95 %

## 2022-09-20 DIAGNOSIS — I51.7 LVH (LEFT VENTRICULAR HYPERTROPHY): ICD-10-CM

## 2022-09-20 DIAGNOSIS — R07.9 CHEST PAIN, UNSPECIFIED TYPE: ICD-10-CM

## 2022-09-20 DIAGNOSIS — I11.9 HYPERTENSIVE HEART DISEASE WITHOUT HEART FAILURE: ICD-10-CM

## 2022-09-20 LAB
ATRIAL RATE - MUSE: 66 BPM
ATRIAL RATE - MUSE: 76 BPM
DIASTOLIC BLOOD PRESSURE - MUSE: NORMAL MMHG
DIASTOLIC BLOOD PRESSURE - MUSE: NORMAL MMHG
INTERPRETATION ECG - MUSE: NORMAL
INTERPRETATION ECG - MUSE: NORMAL
P AXIS - MUSE: 27 DEGREES
P AXIS - MUSE: 41 DEGREES
PR INTERVAL - MUSE: 160 MS
PR INTERVAL - MUSE: 160 MS
QRS DURATION - MUSE: 102 MS
QRS DURATION - MUSE: 98 MS
QT - MUSE: 404 MS
QT - MUSE: 420 MS
QTC - MUSE: 440 MS
QTC - MUSE: 454 MS
R AXIS - MUSE: 10 DEGREES
R AXIS - MUSE: 12 DEGREES
SYSTOLIC BLOOD PRESSURE - MUSE: NORMAL MMHG
SYSTOLIC BLOOD PRESSURE - MUSE: NORMAL MMHG
T AXIS - MUSE: 18 DEGREES
T AXIS - MUSE: 23 DEGREES
VENTRICULAR RATE- MUSE: 66 BPM
VENTRICULAR RATE- MUSE: 76 BPM

## 2022-09-20 PROCEDURE — A9585 GADOBUTROL INJECTION: HCPCS | Performed by: INTERNAL MEDICINE

## 2022-09-20 PROCEDURE — 93010 ELECTROCARDIOGRAM REPORT: CPT | Performed by: INTERNAL MEDICINE

## 2022-09-20 PROCEDURE — 75563 CARD MRI W/STRESS IMG & DYE: CPT | Mod: 26 | Performed by: INTERNAL MEDICINE

## 2022-09-20 PROCEDURE — 93016 CV STRESS TEST SUPVJ ONLY: CPT | Performed by: INTERNAL MEDICINE

## 2022-09-20 PROCEDURE — 75563 CARD MRI W/STRESS IMG & DYE: CPT

## 2022-09-20 PROCEDURE — 250N000011 HC RX IP 250 OP 636: Performed by: INTERNAL MEDICINE

## 2022-09-20 PROCEDURE — 999N000122 MR MYOCARDIUM  OVERREAD

## 2022-09-20 PROCEDURE — 93018 CV STRESS TEST I&R ONLY: CPT | Performed by: INTERNAL MEDICINE

## 2022-09-20 PROCEDURE — 255N000002 HC RX 255 OP 636: Performed by: INTERNAL MEDICINE

## 2022-09-20 PROCEDURE — 93005 ELECTROCARDIOGRAM TRACING: CPT

## 2022-09-20 RX ORDER — GADOBUTROL 604.72 MG/ML
24 INJECTION INTRAVENOUS ONCE
Status: COMPLETED | OUTPATIENT
Start: 2022-09-20 | End: 2022-09-20

## 2022-09-20 RX ORDER — REGADENOSON 0.08 MG/ML
0.4 INJECTION, SOLUTION INTRAVENOUS ONCE
Status: COMPLETED | OUTPATIENT
Start: 2022-09-20 | End: 2022-09-20

## 2022-09-20 RX ORDER — AMINOPHYLLINE 25 MG/ML
50 INJECTION, SOLUTION INTRAVENOUS
Status: DISCONTINUED | OUTPATIENT
Start: 2022-09-20 | End: 2022-09-20 | Stop reason: HOSPADM

## 2022-09-20 RX ADMIN — REGADENOSON 0.4 MG: 0.08 INJECTION, SOLUTION INTRAVENOUS at 08:35

## 2022-09-20 RX ADMIN — GADOBUTROL 24 ML: 604.72 INJECTION INTRAVENOUS at 08:40

## 2022-10-16 ENCOUNTER — HEALTH MAINTENANCE LETTER (OUTPATIENT)
Age: 42
End: 2022-10-16

## 2022-11-09 DIAGNOSIS — I10 BENIGN ESSENTIAL HYPERTENSION: ICD-10-CM

## 2022-11-09 RX ORDER — CHLORTHALIDONE 25 MG/1
25 TABLET ORAL DAILY
Qty: 90 TABLET | Refills: 3 | Status: SHIPPED | OUTPATIENT
Start: 2022-11-09

## 2023-04-01 ENCOUNTER — HEALTH MAINTENANCE LETTER (OUTPATIENT)
Age: 43
End: 2023-04-01

## 2023-11-09 ENCOUNTER — LAB REQUISITION (OUTPATIENT)
Dept: LAB | Facility: CLINIC | Age: 43
End: 2023-11-09

## 2023-11-09 DIAGNOSIS — E78.2 MIXED HYPERLIPIDEMIA: ICD-10-CM

## 2023-11-09 DIAGNOSIS — I10 ESSENTIAL (PRIMARY) HYPERTENSION: ICD-10-CM

## 2023-11-09 PROCEDURE — 80053 COMPREHEN METABOLIC PANEL: CPT | Performed by: FAMILY MEDICINE

## 2023-11-09 PROCEDURE — 80061 LIPID PANEL: CPT | Performed by: FAMILY MEDICINE

## 2023-11-10 LAB
ALBUMIN SERPL BCG-MCNC: 4.4 G/DL (ref 3.5–5.2)
ALP SERPL-CCNC: 51 U/L (ref 40–129)
ALT SERPL W P-5'-P-CCNC: 45 U/L (ref 0–70)
ANION GAP SERPL CALCULATED.3IONS-SCNC: 15 MMOL/L (ref 7–15)
AST SERPL W P-5'-P-CCNC: 29 U/L (ref 0–45)
BILIRUB SERPL-MCNC: 0.6 MG/DL
BUN SERPL-MCNC: 14.3 MG/DL (ref 6–20)
CALCIUM SERPL-MCNC: 9.7 MG/DL (ref 8.6–10)
CHLORIDE SERPL-SCNC: 102 MMOL/L (ref 98–107)
CHOLEST SERPL-MCNC: 211 MG/DL
CREAT SERPL-MCNC: 0.84 MG/DL (ref 0.67–1.17)
DEPRECATED HCO3 PLAS-SCNC: 23 MMOL/L (ref 22–29)
EGFRCR SERPLBLD CKD-EPI 2021: >90 ML/MIN/1.73M2
GLUCOSE SERPL-MCNC: 87 MG/DL (ref 70–99)
HDLC SERPL-MCNC: 47 MG/DL
LDLC SERPL CALC-MCNC: 137 MG/DL
NONHDLC SERPL-MCNC: 164 MG/DL
POTASSIUM SERPL-SCNC: 3.3 MMOL/L (ref 3.4–5.3)
PROT SERPL-MCNC: 7.6 G/DL (ref 6.4–8.3)
SODIUM SERPL-SCNC: 140 MMOL/L (ref 135–145)
TRIGL SERPL-MCNC: 137 MG/DL

## 2024-05-27 PROCEDURE — 99285 EMERGENCY DEPT VISIT HI MDM: CPT | Mod: 25

## 2024-05-27 ASSESSMENT — COLUMBIA-SUICIDE SEVERITY RATING SCALE - C-SSRS
1. IN THE PAST MONTH, HAVE YOU WISHED YOU WERE DEAD OR WISHED YOU COULD GO TO SLEEP AND NOT WAKE UP?: NO
6. HAVE YOU EVER DONE ANYTHING, STARTED TO DO ANYTHING, OR PREPARED TO DO ANYTHING TO END YOUR LIFE?: NO
2. HAVE YOU ACTUALLY HAD ANY THOUGHTS OF KILLING YOURSELF IN THE PAST MONTH?: NO

## 2024-05-28 ENCOUNTER — APPOINTMENT (OUTPATIENT)
Dept: ULTRASOUND IMAGING | Facility: CLINIC | Age: 44
End: 2024-05-28
Attending: EMERGENCY MEDICINE
Payer: COMMERCIAL

## 2024-05-28 ENCOUNTER — HOSPITAL ENCOUNTER (EMERGENCY)
Facility: CLINIC | Age: 44
Discharge: HOME | End: 2024-05-28
Attending: EMERGENCY MEDICINE | Admitting: EMERGENCY MEDICINE
Payer: COMMERCIAL

## 2024-05-28 VITALS
DIASTOLIC BLOOD PRESSURE: 93 MMHG | TEMPERATURE: 98.1 F | RESPIRATION RATE: 16 BRPM | HEART RATE: 61 BPM | HEIGHT: 70 IN | WEIGHT: 315 LBS | BODY MASS INDEX: 45.1 KG/M2 | SYSTOLIC BLOOD PRESSURE: 164 MMHG | OXYGEN SATURATION: 94 %

## 2024-05-28 DIAGNOSIS — R10.11 RIGHT UPPER QUADRANT ABDOMINAL PAIN: ICD-10-CM

## 2024-05-28 LAB
ALBUMIN SERPL BCG-MCNC: 4.2 G/DL (ref 3.5–5.2)
ALP SERPL-CCNC: 56 U/L (ref 40–150)
ALT SERPL W P-5'-P-CCNC: 34 U/L (ref 0–70)
ANION GAP SERPL CALCULATED.3IONS-SCNC: 8 MMOL/L (ref 7–15)
AST SERPL W P-5'-P-CCNC: 25 U/L (ref 0–45)
BASOPHILS # BLD AUTO: 0.1 10E3/UL (ref 0–0.2)
BASOPHILS NFR BLD AUTO: 1 %
BILIRUB SERPL-MCNC: 0.4 MG/DL
BUN SERPL-MCNC: 13.2 MG/DL (ref 6–20)
CALCIUM SERPL-MCNC: 8.7 MG/DL (ref 8.6–10)
CHLORIDE SERPL-SCNC: 106 MMOL/L (ref 98–107)
CREAT SERPL-MCNC: 0.81 MG/DL (ref 0.67–1.17)
DEPRECATED HCO3 PLAS-SCNC: 26 MMOL/L (ref 22–29)
EGFRCR SERPLBLD CKD-EPI 2021: >90 ML/MIN/1.73M2
EOSINOPHIL # BLD AUTO: 0.3 10E3/UL (ref 0–0.7)
EOSINOPHIL NFR BLD AUTO: 4 %
ERYTHROCYTE [DISTWIDTH] IN BLOOD BY AUTOMATED COUNT: 12.2 % (ref 10–15)
GLUCOSE SERPL-MCNC: 117 MG/DL (ref 70–99)
HCT VFR BLD AUTO: 42.6 % (ref 40–53)
HGB BLD-MCNC: 15.4 G/DL (ref 13.3–17.7)
IMM GRANULOCYTES # BLD: 0 10E3/UL
IMM GRANULOCYTES NFR BLD: 0 %
LIPASE SERPL-CCNC: 42 U/L (ref 13–60)
LYMPHOCYTES # BLD AUTO: 2.9 10E3/UL (ref 0.8–5.3)
LYMPHOCYTES NFR BLD AUTO: 32 %
MCH RBC QN AUTO: 32.2 PG (ref 26.5–33)
MCHC RBC AUTO-ENTMCNC: 36.2 G/DL (ref 31.5–36.5)
MCV RBC AUTO: 89 FL (ref 78–100)
MONOCYTES # BLD AUTO: 0.7 10E3/UL (ref 0–1.3)
MONOCYTES NFR BLD AUTO: 7 %
NEUTROPHILS # BLD AUTO: 5.2 10E3/UL (ref 1.6–8.3)
NEUTROPHILS NFR BLD AUTO: 56 %
NRBC # BLD AUTO: 0 10E3/UL
NRBC BLD AUTO-RTO: 0 /100
PLATELET # BLD AUTO: 299 10E3/UL (ref 150–450)
POTASSIUM SERPL-SCNC: 3.4 MMOL/L (ref 3.4–5.3)
PROT SERPL-MCNC: 7.4 G/DL (ref 6.4–8.3)
RBC # BLD AUTO: 4.78 10E6/UL (ref 4.4–5.9)
SODIUM SERPL-SCNC: 140 MMOL/L (ref 135–145)
WBC # BLD AUTO: 9.2 10E3/UL (ref 4–11)

## 2024-05-28 PROCEDURE — 85025 COMPLETE CBC W/AUTO DIFF WBC: CPT | Performed by: EMERGENCY MEDICINE

## 2024-05-28 PROCEDURE — 83690 ASSAY OF LIPASE: CPT | Performed by: EMERGENCY MEDICINE

## 2024-05-28 PROCEDURE — 82040 ASSAY OF SERUM ALBUMIN: CPT | Performed by: EMERGENCY MEDICINE

## 2024-05-28 PROCEDURE — 96361 HYDRATE IV INFUSION ADD-ON: CPT

## 2024-05-28 PROCEDURE — 76705 ECHO EXAM OF ABDOMEN: CPT

## 2024-05-28 PROCEDURE — 258N000003 HC RX IP 258 OP 636: Performed by: EMERGENCY MEDICINE

## 2024-05-28 PROCEDURE — 250N000011 HC RX IP 250 OP 636: Performed by: EMERGENCY MEDICINE

## 2024-05-28 PROCEDURE — 96375 TX/PRO/DX INJ NEW DRUG ADDON: CPT

## 2024-05-28 PROCEDURE — 36415 COLL VENOUS BLD VENIPUNCTURE: CPT | Performed by: EMERGENCY MEDICINE

## 2024-05-28 PROCEDURE — 96374 THER/PROPH/DIAG INJ IV PUSH: CPT

## 2024-05-28 RX ORDER — OMEPRAZOLE 40 MG/1
40 CAPSULE, DELAYED RELEASE ORAL DAILY
Qty: 14 CAPSULE | Refills: 0 | Status: SHIPPED | OUTPATIENT
Start: 2024-05-28 | End: 2024-06-11

## 2024-05-28 RX ORDER — KETOROLAC TROMETHAMINE 15 MG/ML
15 INJECTION, SOLUTION INTRAMUSCULAR; INTRAVENOUS ONCE
Status: COMPLETED | OUTPATIENT
Start: 2024-05-28 | End: 2024-05-28

## 2024-05-28 RX ORDER — ONDANSETRON 2 MG/ML
4 INJECTION INTRAMUSCULAR; INTRAVENOUS EVERY 30 MIN PRN
Status: DISCONTINUED | OUTPATIENT
Start: 2024-05-28 | End: 2024-05-28 | Stop reason: HOSPADM

## 2024-05-28 RX ADMIN — SODIUM CHLORIDE 1000 ML: 9 INJECTION, SOLUTION INTRAVENOUS at 00:42

## 2024-05-28 RX ADMIN — ONDANSETRON 4 MG: 2 INJECTION INTRAMUSCULAR; INTRAVENOUS at 00:48

## 2024-05-28 RX ADMIN — FAMOTIDINE 20 MG: 10 INJECTION INTRAVENOUS at 00:49

## 2024-05-28 RX ADMIN — KETOROLAC TROMETHAMINE 15 MG: 15 INJECTION, SOLUTION INTRAMUSCULAR; INTRAVENOUS at 00:48

## 2024-05-28 ASSESSMENT — ACTIVITIES OF DAILY LIVING (ADL)
ADLS_ACUITY_SCORE: 35
ADLS_ACUITY_SCORE: 35

## 2024-05-28 NOTE — ED PROVIDER NOTES
EMERGENCY DEPARTMENT ENCOUNTER      NAME: Milton Rivera  AGE: 43 year old male  YOB: 1980  MRN: [id number]  EVALUATION DATE & TIME: 5/28/2024 12:02 AM    PCP: Timo Pack    ED PROVIDER: Rogelio Richardson M.D.      Chief Complaint   Patient presents with    Abdominal Pain         FINAL IMPRESSION:  1. Right upper quadrant abdominal pain          ED COURSE & MEDICAL DECISION MAKING:    Pertinent Labs & Imaging studies reviewed. (See chart for details)  43 year old male presents to the Emergency Department for evaluation of abdominal pain.  Having epigastric/right upper quadrant pain.  Some tenderness right upper quadrant.  Did consider hepatobiliary disease.  Ultrasounds are normal.  LFTs are normal.  Lipase is normal.  Symptoms most consistent with gastritis/reflux.  Pain improved here.  White count is normal.  No signs of GI bleeding.  Will start him on omeprazole.  Well and follow-up with his primary.  Return for worsening symptoms.  Lower abdominal tenderness.  I do not think this is obstruction or appendicitis    12:13 AM I met with the patient to gather history and to perform my initial exam. I discussed the plan for care while in the Emergency Department.       At the conclusion of the encounter I discussed the results of all of the tests and the disposition. The questions were answered. The patient or family acknowledged understanding and was agreeable with the care plan.     Medical Decision Making  Obtained supplemental history:Supplemental history obtained?: No  Reviewed external records: External records reviewed?: Documented in chart  Care impacted by chronic illness:Hyperlipidemia, Hypertension, and Other: GERD  Care significantly affected by social determinants of health:Access to Medical Care  Did you consider but not order tests?: Work up considered but not performed and documented in chart, if applicable  Did you interpret images independently?: Independent interpretation  of ECG and images noted in documentation, when applicable.  Consultation discussion with other provider:Did you involve another provider (consultant, , pharmacy, etc.)?: No  Discharge. I prescribed additional prescription strength medication(s) as charted. See documentation for any additional details.         MEDICATIONS GIVEN IN THE EMERGENCY:  Medications   ondansetron (ZOFRAN) injection 4 mg (4 mg Intravenous $Given 5/28/24 0048)   sodium chloride 0.9% BOLUS 1,000 mL (0 mLs Intravenous Stopped 5/28/24 0156)   ketorolac (TORADOL) injection 15 mg (15 mg Intravenous $Given 5/28/24 0048)   famotidine (PEPCID) injection 20 mg (20 mg Intravenous $Given 5/28/24 0049)       NEW PRESCRIPTIONS STARTED AT TODAY'S ER VISIT  Discharge Medication List as of 5/28/2024  2:28 AM        START taking these medications    Details   omeprazole (PRILOSEC) 40 MG DR capsule Take 1 capsule (40 mg) by mouth daily for 14 days, Disp-14 capsule, R-0, Local Print                =================================================================    HPI    Patient information was obtained from: The patient    Use of : N/A    Milton Rivera is a 43 year old male with a pertinent history of GERD, HLD, HTN, who presents to this ED for evaluation of abdominal pain.    The patient woke up with abdominal pain on Wednesday (6 days ago) and has been recurring every other day since. He further notes having pain across his lower abdomen. He tried taking aspirin without any relief. He has a history of acid reflux, but notes his pain does not feel similar to that time. He is on anti-hypertensive medications. He does not smoke cigarettes. He drinks alcohol on rare occasions.    Otherwise, the patient denied having nausea, vomiting, fevers, and any other medical complaints at this time.          PAST MEDICAL HISTORY:  Past Medical History:   Diagnosis Date    Arthritis     Knees    COVID 01/2022    Gastro-oesophageal reflux disease      Hyperlipidemia     Hypertension     DELMY (obstructive sleep apnea)     Uses CPAP    PONV (postoperative nausea and vomiting)     Sleep apnea     Uses CPAP       PAST SURGICAL HISTORY:  Past Surgical History:   Procedure Laterality Date    ARTHROSCOPY KNEE RT/LT Bilateral     ARTHROSCOPY SHOULDER RT/LT Bilateral     BUNIONECTOMY Left 11/21/2014    Procedure: BUNIONECTOMY;  Surgeon: Rome Munoz DPM;  Location: [facility] OR    BUNIONECTOMY RT/LT Right            CURRENT MEDICATIONS:    Current Facility-Administered Medications   Medication Dose Route Frequency Provider Last Rate Last Admin    ondansetron (ZOFRAN) injection 4 mg  4 mg Intravenous Q30 Min PRN Rogelio Richardson MD   4 mg at 05/28/24 0048     Current Outpatient Medications   Medication Sig Dispense Refill    omeprazole (PRILOSEC) 40 MG DR capsule Take 1 capsule (40 mg) by mouth daily for 14 days 14 capsule 0    amLODIPine (NORVASC) 10 MG tablet Take 10 mg by mouth daily      chlorthalidone (HYGROTON) 25 MG tablet Take 1 tablet (25 mg) by mouth daily 90 tablet 3    losartan (COZAAR) 100 MG tablet Take 100 mg by mouth daily      Metoprolol Succinate 200 MG CS24 Take 200 mg by mouth daily      potassium chloride ER (KLOR-CON M) 20 MEQ CR tablet Take 2 tablets (40 mEq) by mouth 2 times daily 120 tablet 3         ALLERGIES:  No Known Allergies    FAMILY HISTORY:  Family History   Problem Relation Age of Onset    Diabetes Father     Coronary Artery Disease Father         triple bypass    Myocardial Infarction Father     C.A.D. Maternal Grandfather         MI at age 72       SOCIAL HISTORY:   Social History     Socioeconomic History    Marital status:     Number of children: 1   Occupational History    Occupation: Customer service     Employer: [employer]   Tobacco Use    Smoking status: Never    Smokeless tobacco: Never   Substance and Sexual Activity    Alcohol use: Yes     Comment: 1-2 / month    Drug use: No    Sexual activity: Yes     Partners: Female      "Birth control/protection: Condom   Other Topics Concern    Special Diet No    Exercise Yes     Comment: daily     Social Determinants of Health      Received from [facility]    Financial Resource Strain    Received from [facility]    Social Connections       VITALS:  BP (!) 164/93   Pulse 61   Temp 98.1  F (36.7  C) (Oral)   Resp 16   Ht 1.778 m (5' 10\")   Wt 149.7 kg (330 lb)   SpO2 94%   BMI 47.35 kg/m      PHYSICAL EXAM    Physical Exam  Vitals and nursing note reviewed.   Constitutional:       General: He is not in acute distress.     Appearance: He is not diaphoretic.   HENT:      Head: Atraumatic.   Eyes:      General: No scleral icterus.     Pupils: Pupils are equal, round, and reactive to light.   Cardiovascular:      Rate and Rhythm: Normal rate and regular rhythm.      Heart sounds: Normal heart sounds.   Pulmonary:      Effort: No respiratory distress.      Breath sounds: Normal breath sounds.   Abdominal:      Palpations: Abdomen is soft.      Tenderness: There is abdominal tenderness in the right upper quadrant and epigastric area.   Musculoskeletal:         General: No tenderness.   Lymphadenopathy:      Cervical: No cervical adenopathy.   Skin:     General: Skin is warm.      Findings: No rash.           LAB:  All pertinent labs reviewed and interpreted.  Labs Ordered and Resulted from Time of ED Arrival to Time of ED Departure   COMPREHENSIVE METABOLIC PANEL - Abnormal       Result Value    Sodium 140      Potassium 3.4      Carbon Dioxide (CO2) 26      Anion Gap 8      Urea Nitrogen 13.2      Creatinine 0.81      GFR Estimate >90      Calcium 8.7      Chloride 106      Glucose 117 (*)     Alkaline Phosphatase 56      AST 25      ALT 34      Protein Total 7.4      Albumin 4.2      Bilirubin Total 0.4     LIPASE - Normal    Lipase 42     CBC WITH PLATELETS AND DIFFERENTIAL    WBC Count 9.2      RBC Count 4.78      Hemoglobin " 15.4      Hematocrit 42.6      MCV 89      MCH 32.2      MCHC 36.2      RDW 12.2      Platelet Count 299      % Neutrophils 56      % Lymphocytes 32      % Monocytes 7      % Eosinophils 4      % Basophils 1      % Immature Granulocytes 0      NRBCs per 100 WBC 0      Absolute Neutrophils 5.2      Absolute Lymphocytes 2.9      Absolute Monocytes 0.7      Absolute Eosinophils 0.3      Absolute Basophils 0.1      Absolute Immature Granulocytes 0.0      Absolute NRBCs 0.0         RADIOLOGY:  Reviewed all pertinent imaging. Please see official radiology report.  US Abdomen Limited (RUQ)   Final Result   IMPRESSION:    1. Unremarkable appearance of the gallbladder.   2. No biliary dilatation.   3. Mild-to-moderate diffuse fatty infiltration of the liver.                I, Ronal Velasco, am serving as a scribe to document services personally performed by Dr. Rogelio Richardson, based on my observation and the provider's statements to me. I, Rogelio Richardson MD attest that Ronal Velasco is acting in a scribe capacity, has observed my performance of the services and has documented them in accordance with my direction.    Rogelio Richardson M.D.  Emergency Medicine  [facility] EMERGENCY ROOM  [id number] [facility] [id number] [phone]  Dept: [phone]       Rogelio Richardson MD  05/28/24 0350

## 2024-05-28 NOTE — ED TRIAGE NOTES
Upper abdominal pain that started around 2200 this evening. experienced similar symptoms 2x this week.      Triage Assessment (Adult)       Row Name 05/27/24 1145          Triage Assessment    Airway WDL WDL        Respiratory WDL    Respiratory WDL WDL        Skin Circulation/Temperature WDL    Skin Circulation/Temperature WDL WDL        Cardiac WDL    Cardiac WDL WDL        Peripheral/Neurovascular WDL    Peripheral Neurovascular WDL WDL        Cognitive/Neuro/Behavioral WDL    Cognitive/Neuro/Behavioral WDL WDL

## 2024-06-01 ENCOUNTER — HEALTH MAINTENANCE LETTER (OUTPATIENT)
Age: 44
End: 2024-06-01

## 2024-10-17 ENCOUNTER — APPOINTMENT (OUTPATIENT)
Dept: URBAN - METROPOLITAN AREA CLINIC 260 | Age: 44
Setting detail: DERMATOLOGY
End: 2024-10-17

## 2024-10-17 VITALS — WEIGHT: 315 LBS | HEIGHT: 70 IN

## 2024-10-17 DIAGNOSIS — L81.4 OTHER MELANIN HYPERPIGMENTATION: ICD-10-CM

## 2024-10-17 DIAGNOSIS — Z87.2 PERSONAL HISTORY OF DISEASES OF THE SKIN AND SUBCUTANEOUS TISSUE: ICD-10-CM

## 2024-10-17 DIAGNOSIS — L82.1 OTHER SEBORRHEIC KERATOSIS: ICD-10-CM

## 2024-10-17 DIAGNOSIS — Z71.89 OTHER SPECIFIED COUNSELING: ICD-10-CM

## 2024-10-17 DIAGNOSIS — D22 MELANOCYTIC NEVI: ICD-10-CM

## 2024-10-17 DIAGNOSIS — D18.0 HEMANGIOMA: ICD-10-CM

## 2024-10-17 PROBLEM — D18.01 HEMANGIOMA OF SKIN AND SUBCUTANEOUS TISSUE: Status: ACTIVE | Noted: 2024-10-17

## 2024-10-17 PROBLEM — D22.5 MELANOCYTIC NEVI OF TRUNK: Status: ACTIVE | Noted: 2024-10-17

## 2024-10-17 PROCEDURE — OTHER COUNSELING: OTHER

## 2024-10-17 PROCEDURE — 99213 OFFICE O/P EST LOW 20 MIN: CPT

## 2024-10-17 PROCEDURE — OTHER MIPS QUALITY: OTHER

## 2024-10-17 ASSESSMENT — LOCATION DETAILED DESCRIPTION DERM
LOCATION DETAILED: INFERIOR THORACIC SPINE
LOCATION DETAILED: SUPERIOR LUMBAR SPINE
LOCATION DETAILED: RIGHT INFERIOR UPPER BACK

## 2024-10-17 ASSESSMENT — LOCATION SIMPLE DESCRIPTION DERM
LOCATION SIMPLE: LOWER BACK
LOCATION SIMPLE: UPPER BACK
LOCATION SIMPLE: RIGHT UPPER BACK

## 2024-10-17 ASSESSMENT — LOCATION ZONE DERM: LOCATION ZONE: TRUNK

## 2024-12-22 ENCOUNTER — HEALTH MAINTENANCE LETTER (OUTPATIENT)
Age: 44
End: 2024-12-22

## 2025-08-08 ENCOUNTER — LAB REQUISITION (OUTPATIENT)
Dept: LAB | Facility: CLINIC | Age: 45
End: 2025-08-08

## 2025-08-08 DIAGNOSIS — I10 ESSENTIAL (PRIMARY) HYPERTENSION: ICD-10-CM

## 2025-08-08 DIAGNOSIS — E78.2 MIXED HYPERLIPIDEMIA: ICD-10-CM

## 2025-08-08 PROCEDURE — 82374 ASSAY BLOOD CARBON DIOXIDE: CPT | Performed by: FAMILY MEDICINE

## 2025-08-08 PROCEDURE — 84478 ASSAY OF TRIGLYCERIDES: CPT | Performed by: FAMILY MEDICINE

## 2025-08-09 LAB
ALBUMIN SERPL BCG-MCNC: 4.3 G/DL (ref 3.5–5.2)
ALP SERPL-CCNC: 75 U/L (ref 40–150)
ALT SERPL W P-5'-P-CCNC: 27 U/L (ref 0–70)
ANION GAP SERPL CALCULATED.3IONS-SCNC: 15 MMOL/L (ref 7–15)
AST SERPL W P-5'-P-CCNC: 28 U/L (ref 0–45)
BILIRUB SERPL-MCNC: 0.7 MG/DL
BUN SERPL-MCNC: 16.2 MG/DL (ref 6–20)
CALCIUM SERPL-MCNC: 9.3 MG/DL (ref 8.8–10.4)
CHLORIDE SERPL-SCNC: 105 MMOL/L (ref 98–107)
CHOLEST SERPL-MCNC: 180 MG/DL
CREAT SERPL-MCNC: 0.89 MG/DL (ref 0.67–1.17)
EGFRCR SERPLBLD CKD-EPI 2021: >90 ML/MIN/1.73M2
FASTING STATUS PATIENT QL REPORTED: NO
FASTING STATUS PATIENT QL REPORTED: NO
GLUCOSE SERPL-MCNC: 87 MG/DL (ref 70–99)
HCO3 SERPL-SCNC: 20 MMOL/L (ref 22–29)
HDLC SERPL-MCNC: 51 MG/DL
LDLC SERPL CALC-MCNC: 110 MG/DL
NONHDLC SERPL-MCNC: 129 MG/DL
POTASSIUM SERPL-SCNC: 3.6 MMOL/L (ref 3.4–5.3)
PROT SERPL-MCNC: 7.5 G/DL (ref 6.4–8.3)
SODIUM SERPL-SCNC: 140 MMOL/L (ref 135–145)
TRIGL SERPL-MCNC: 96 MG/DL